# Patient Record
Sex: MALE | Race: WHITE | Employment: OTHER | RURAL
[De-identification: names, ages, dates, MRNs, and addresses within clinical notes are randomized per-mention and may not be internally consistent; named-entity substitution may affect disease eponyms.]

---

## 2019-07-18 PROBLEM — E11.9 CONTROLLED TYPE 2 DIABETES MELLITUS WITHOUT COMPLICATION, WITHOUT LONG-TERM CURRENT USE OF INSULIN (HCC): Status: ACTIVE | Noted: 2019-07-18

## 2019-07-18 PROBLEM — I73.9 PERIPHERAL VASCULAR DISEASE (HCC): Status: ACTIVE | Noted: 2019-07-18

## 2019-07-18 PROBLEM — Z98.890 HISTORY OF CAROTID ENDARTERECTOMY: Status: ACTIVE | Noted: 2019-07-18

## 2019-07-18 PROBLEM — I10 ESSENTIAL HYPERTENSION: Status: ACTIVE | Noted: 2019-07-18

## 2019-07-18 PROBLEM — E78.00 HYPERCHOLESTEROLEMIA: Status: ACTIVE | Noted: 2019-07-18

## 2019-10-07 ENCOUNTER — OFFICE VISIT (OUTPATIENT)
Dept: SURGERY | Age: 74
End: 2019-10-07

## 2019-10-07 VITALS
BODY MASS INDEX: 27.89 KG/M2 | SYSTOLIC BLOOD PRESSURE: 171 MMHG | TEMPERATURE: 97.7 F | RESPIRATION RATE: 18 BRPM | HEIGHT: 68 IN | WEIGHT: 184 LBS | OXYGEN SATURATION: 98 % | HEART RATE: 67 BPM | DIASTOLIC BLOOD PRESSURE: 91 MMHG

## 2019-10-07 DIAGNOSIS — R19.5 POSITIVE FIT (FECAL IMMUNOCHEMICAL TEST): Primary | ICD-10-CM

## 2019-10-07 NOTE — PATIENT INSTRUCTIONS
Learning About Colonoscopy  What is a colonoscopy? A colonoscopy is a test (also called a procedure) that lets a doctor look inside your large intestine. The doctor uses a thin, lighted tube called a colonoscope. The doctor uses it to look for small growths called polyps, colon or rectal cancer (colorectal cancer), or other problems like bleeding. During the procedure, the doctor can take samples of tissue. The samples can then be checked for cancer or other conditions. The doctor can also take out polyps. How is a colonoscopy done? This procedure is done in a doctor's office or a clinic or hospital. You will get medicine to help you relax and not feel pain. Some people find that they do not remember having the test because of the medicine. The doctor gently moves the colonoscope, or scope, through the colon. The scope is also a small video camera. It lets the doctor see the colon and take pictures. A colonoscopy usually takes 30 to 45 minutes. It may take longer if the doctor has to remove polyps. How do you prepare for the procedure? You need to clean out your colon before the procedure so the doctor can see all of your colon. You may start the cleaning process a day or two before the test. This depends on which \"colon prep\" your doctor recommends. To clean your colon, you stop eating solid foods and drink only clear liquids. You can have water, tea, coffee, clear juices, clear broths, flavored ice pops, and gelatin (such as Jell-O). Do not drink anything red or purple, such as grape juice or fruit punch. And do not eat red or purple foods, such as grape ice pops or cherry gelatin. The day or night before the procedure, you drink a large amount of a special liquid. This causes loose, frequent stools. You will go to the bathroom a lot. It is very important to drink all of the colon prep liquid. If you have problems drinking the liquid, call your doctor.   For many people, the prep is worse than the test. It may be uncomfortable, and you may feel hungry on the clear liquid diet. Some people do not go to work or do their usual activities on the day of the prep. Arrange to have someone take you home after the test.  What can you expect after a colonoscopy? The nurses will watch you for 1 to 2 hours until the medicines wear off. Then you can go home. You will need a ride. Your doctor will tell you when you can eat and do your usual activities. Your doctor will talk to you about when you will need your next colonoscopy. The results of your test and your risk for colorectal cancer will help your doctor decide how often you need to be checked. Follow-up care is a key part of your treatment and safety. Be sure to make and go to all appointments, and call your doctor if you are having problems. It's also a good idea to know your test results and keep a list of the medicines you take. Where can you learn more? Go to http://mario-romy.info/. Enter Z063 in the search box to learn more about \"Learning About Colonoscopy. \"  Current as of: December 19, 2018  Content Version: 12.2  © 4928-7125 "[employer]. Care instructions adapted under license by [employer] (which disclaims liability or warranty for this information). If you have questions about a medical condition or this instruction, always ask your healthcare professional. [address] any warranty or liability for your use of this information.

## 2019-10-07 NOTE — PROGRESS NOTES
Kolton Anthony is a 69 y.o. male who presents today with the following:  Chief Complaint   Patient presents with    Melena       HPI    67-year-old male who presents to me as a referral by Dr. Lázaro Castaneda for positive fit test.  He has never had a colonoscopy. He has no known family history of colon cancer. He denies any melena or hematochezia or abdominal pain or weight loss or diarrhea or constipation. 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 He underwent a right carotid endarterectomy that was complicated with a hematoma that required evacuation on postoperative day 0. He is actually scheduled to have the left carotid performed on November 5 at the [facility] in [facility]. He believes he has a 70% occlusion of the left carotid. He is on an 81 mg aspirin a day. Past Medical History:   Diagnosis Date    HTN (hypertension)     Stroke [facility])        Past Surgical History:   Procedure Laterality Date    HX CAROTID ENDARTERECTOMY Right 06/2019       Social History     Socioeconomic History    Marital status:      Spouse name: Not on file    Number of children: Not on file    Years of education: Not on file    Highest education level: Not on file   Occupational History    Not on file   Social Needs    Financial resource strain: Not on file    Food insecurity:     Worry: Not on file     Inability: Not on file    Transportation needs:     Medical: Not on file     Non-medical: Not on file   Tobacco Use    Smoking status: Former Smoker    Smokeless tobacco: Never Used   Substance and Sexual Activity    Alcohol use:  Yes     Alcohol/week: 3.0 standard drinks     Types: 3 Cans of beer per week    Drug use: Never    Sexual activity: Yes   Lifestyle    Physical activity:     Days per week: Not on file     Minutes per session: Not on file    Stress: Not on file   Relationships    Social connections:     Talks on phone: Not on file     Gets together: Not on file     Attends [employer] service: Not on file     Active member of club or organization: Not on file     Attends meetings of clubs or organizations: Not on file     Relationship status: Not on file    Intimate partner violence:     Fear of current or ex partner: Not on file     Emotionally abused: Not on file     Physically abused: Not on file     Forced sexual activity: Not on file   Other Topics Concern    Not on file   Social History Narrative    Not on file       Family History   Problem Relation Age of Onset    Diabetes Father        No Known Allergies    Current Outpatient Medications   Medication Sig    amLODIPine (NORVASC) 2.5 mg tablet Take 1 Tab by mouth daily.  atorvastatin (LIPITOR) 40 mg tablet Take 1 Tab by mouth daily.  metFORMIN ER (GLUCOPHAGE XR) 500 mg tablet Take 1 Tab by mouth daily.  metoprolol succinate (TOPROL-XL) 25 mg XL tablet Take 0.5 Tabs by mouth two (2) times a day. Take 0.5 tab by mouth every 12 hours    aspirin delayed-release 81 mg tablet Take  by mouth daily.  ketoconazole (NIZORAL) 2 % topical cream Apply  to affected area two (2) times a day. No current facility-administered medications for this visit. The above histories, medications and allergies have been reviewed. Review of Systems   HENT: Positive for tinnitus. Skin: Positive for itching. Visit Vitals  BP (!) 171/91 (BP 1 Location: Left arm, BP Patient Position: Sitting)   Pulse 67   Temp 97.7 °F (36.5 °C) (Oral)   Resp 18   Ht 5' 8\" (1.727 m)   Wt 184 lb (83.5 kg)   SpO2 98%   BMI 27.98 kg/m²     Physical Exam   Constitutional: He is well-developed, well-nourished, and in no distress. Cardiovascular: Normal rate, regular rhythm and normal heart sounds. Pulmonary/Chest: Effort normal and breath sounds normal. No respiratory distress. He has no wheezes. He has no rales. Abdominal: Soft. He exhibits no distension. There is no tenderness.  There is no rebound and no guarding. 1. Positive FIT (fecal immunochemical test)  Recommend colonoscopy. Would like to make sure that the vascular surgical team feels that this is appropriate prior to his left carotid endarterectomy. He is scheduled to be seen at the [facility] for a preoperative visit on October 24 with anticipated surgery on November 5. We will plan on colonoscopy on October 29 assuming that he gets the okay on his preoperative evaluation. Risks of the procedure were shared including the risks of missed lesions, incomplete exam or colon injury or perforation. He understands and wishes to proceed. Follow-up and Dispositions    · Return for post procedure.          Claudia Miller MD

## 2019-11-08 PROBLEM — Z87.891 FORMER SMOKER: Status: ACTIVE | Noted: 2019-11-08

## 2020-07-10 PROBLEM — B35.1 TINEA UNGUIUM: Status: ACTIVE | Noted: 2020-07-10

## 2020-07-10 PROBLEM — R19.5 POSITIVE FIT (FECAL IMMUNOCHEMICAL TEST): Status: RESOLVED | Noted: 2019-10-07 | Resolved: 2020-07-10

## 2021-03-18 ENCOUNTER — OFFICE VISIT (OUTPATIENT)
Dept: FAMILY MEDICINE CLINIC | Age: 76
End: 2021-03-18
Payer: OTHER GOVERNMENT

## 2021-03-18 VITALS
TEMPERATURE: 96.1 F | BODY MASS INDEX: 26.95 KG/M2 | HEART RATE: 60 BPM | HEIGHT: 68 IN | OXYGEN SATURATION: 97 % | RESPIRATION RATE: 18 BRPM | SYSTOLIC BLOOD PRESSURE: 127 MMHG | DIASTOLIC BLOOD PRESSURE: 72 MMHG | WEIGHT: 177.8 LBS

## 2021-03-18 DIAGNOSIS — B35.1 ONYCHOMYCOSIS: ICD-10-CM

## 2021-03-18 DIAGNOSIS — E11.9 CONTROLLED TYPE 2 DIABETES MELLITUS WITHOUT COMPLICATION, WITHOUT LONG-TERM CURRENT USE OF INSULIN (HCC): ICD-10-CM

## 2021-03-18 DIAGNOSIS — I10 ESSENTIAL HYPERTENSION: ICD-10-CM

## 2021-03-18 DIAGNOSIS — B35.1 TINEA UNGUIUM: ICD-10-CM

## 2021-03-18 DIAGNOSIS — Z98.890 HISTORY OF CAROTID ENDARTERECTOMY: ICD-10-CM

## 2021-03-18 DIAGNOSIS — I73.9 PERIPHERAL VASCULAR DISEASE (HCC): Primary | ICD-10-CM

## 2021-03-18 DIAGNOSIS — E78.00 HYPERCHOLESTEROLEMIA: ICD-10-CM

## 2021-03-18 PROCEDURE — 36415 COLL VENOUS BLD VENIPUNCTURE: CPT | Performed by: FAMILY MEDICINE

## 2021-03-18 PROCEDURE — 99214 OFFICE O/P EST MOD 30 MIN: CPT | Performed by: FAMILY MEDICINE

## 2021-03-18 RX ORDER — INSULIN PUMP SYRINGE, 3 ML
EACH MISCELLANEOUS
Qty: 1 KIT | Refills: 0 | Status: SHIPPED | OUTPATIENT
Start: 2021-03-18

## 2021-03-18 RX ORDER — AMLODIPINE BESYLATE 5 MG/1
5 TABLET ORAL DAILY
Qty: 90 TAB | Refills: 1 | Status: SHIPPED | OUTPATIENT
Start: 2021-03-18 | End: 2021-08-06 | Stop reason: SDUPTHER

## 2021-03-18 RX ORDER — ATORVASTATIN CALCIUM 40 MG/1
40 TABLET, FILM COATED ORAL DAILY
Qty: 90 TAB | Refills: 1 | Status: SHIPPED | OUTPATIENT
Start: 2021-03-18 | End: 2022-10-20 | Stop reason: SDUPTHER

## 2021-03-18 RX ORDER — HYDROCHLOROTHIAZIDE 12.5 MG/1
12.5 TABLET ORAL DAILY
Qty: 90 TAB | Refills: 1 | Status: SHIPPED | OUTPATIENT
Start: 2021-03-18 | End: 2022-01-26 | Stop reason: SDUPTHER

## 2021-03-18 RX ORDER — METFORMIN HYDROCHLORIDE 500 MG/1
500 TABLET, EXTENDED RELEASE ORAL DAILY
Qty: 90 TAB | Refills: 1 | Status: SHIPPED | OUTPATIENT
Start: 2021-03-18 | End: 2021-03-18

## 2021-03-18 RX ORDER — METOPROLOL TARTRATE 50 MG/1
50 TABLET ORAL 2 TIMES DAILY
Qty: 180 TAB | Refills: 1 | Status: SHIPPED | OUTPATIENT
Start: 2021-03-18 | End: 2021-08-06 | Stop reason: SDUPTHER

## 2021-03-18 RX ORDER — KETOCONAZOLE 20 MG/G
CREAM TOPICAL 2 TIMES DAILY
Qty: 30 G | Refills: 5 | Status: SHIPPED | OUTPATIENT
Start: 2021-03-18 | End: 2021-08-06 | Stop reason: SDUPTHER

## 2021-03-18 RX ORDER — ASPIRIN 81 MG/1
81 TABLET ORAL DAILY
Qty: 90 TAB | Refills: 1 | Status: SHIPPED | OUTPATIENT
Start: 2021-03-18

## 2021-03-18 NOTE — PROGRESS NOTES
1. Have you been to the ER, urgent care clinic since your last visit? Hospitalized since your last visit?no    2. Have you seen or consulted any other health care providers outside of the [address] [place] [address] since your last visit? Include any pap smears or colon screening.  no

## 2021-03-18 NOTE — PROGRESS NOTES
Mary Go is a 77 y.o. male who presents with the following:  Chief Complaint   Patient presents with    Hypertension    Vision Change    Cholesterol Problem    Diabetes       Patient's hypertension is doing well on his current medications without any chest pain shortness of breath nor edema. Patient's diabetes seems to be doing fairly well but he is due his laboratories and he has not been testing his sugars and he reports that he is having trouble with his vision although if he shaves his eyes he can see much better. He has not had an eye exam and over 2 years. I explained to the patient this could either be abnormal sugar being laid down in his lens or is developing a cataract but he needs a slit lamp examination by an ophthalmologist.  Patient does have elevated lipids and is on atorvastatin without any muscle pain or weakness. No Known Allergies    Current Outpatient Medications   Medication Sig    amLODIPine (NORVASC) 5 mg tablet Take 1 Tab by mouth daily.  aspirin delayed-release 81 mg tablet Take 1 Tab by mouth daily.  atorvastatin (LIPITOR) 40 mg tablet Take 1 Tab by mouth daily.  hydroCHLOROthiazide (HYDRODIURIL) 12.5 mg tablet Take 1 Tab by mouth daily.  ketoconazole (NIZORAL) 2 % topical cream Apply  to affected area two (2) times a day.  metFORMIN ER (GLUCOPHAGE XR) 500 mg tablet Take 1 Tab by mouth daily.  metoprolol tartrate (LOPRESSOR) 50 mg tablet Take 1 Tab by mouth two (2) times a day.  Blood-Glucose Meter monitoring kit Use daily as directed to monitor glucose     No current facility-administered medications for this visit.         Past Medical History:   Diagnosis Date    DM [facility]diabetes mellitus) ([facility] [address]     HTN (hypertension)     Positive FIT (fecal immunochemical test) 10/7/2019    Stroke [facility])        Past Surgical History:   Procedure Laterality Date    HX CAROTID ENDARTERECTOMY Right 06/2019    HX CAROTID ENDARTERECTOMY Left 11/05/2019       Family History Problem Relation Age of Onset    Diabetes Father        Social History     Socioeconomic History    Marital status:      Spouse name: Not on file    Number of children: Not on file    Years of education: Not on file    Highest education level: Not on file   Tobacco Use    Smoking status: Former Smoker    Smokeless tobacco: Never Used   Substance and Sexual Activity    Alcohol use: Yes     Alcohol/week: 3.0 standard drinks     Types: 3 Cans of beer per week    Drug use: Never    Sexual activity: Yes       Review of Systems   Constitutional: Negative for chills, fever, malaise/fatigue and weight loss. HENT: Negative for congestion, hearing loss, sore throat and tinnitus. Eyes: Positive for blurred vision. Negative for pain and discharge. Respiratory: Negative for cough, shortness of breath and wheezing. Cardiovascular: Negative for chest pain, palpitations, orthopnea, claudication and leg swelling. Gastrointestinal: Negative for abdominal pain, constipation and heartburn. Genitourinary: Negative for dysuria, frequency and urgency. Musculoskeletal: Negative for falls, joint pain and myalgias. Skin: Negative for itching and rash. Neurological: Negative for dizziness, tingling, tremors and headaches. Endo/Heme/Allergies: Negative for environmental allergies and polydipsia. Psychiatric/Behavioral: Negative for depression and substance abuse. The patient is not nervous/anxious. Visit Vitals  /72   Pulse 60   Temp (!) 96.1 °F (35.6 °C)   Resp 18   Ht 5' 8\" (1.727 m)   Wt 177 lb 12.8 oz (80.6 kg)   SpO2 97%   BMI 27.03 kg/m²     Physical Exam  Vitals signs reviewed. Constitutional:       General: He is not in acute distress. Appearance: Normal appearance. He is not ill-appearing. HENT:      Head: Normocephalic and atraumatic.       Right Ear: Tympanic membrane, ear canal and external ear normal.      Left Ear: Tympanic membrane, ear canal and external ear normal. Nose: Nose normal. No congestion or rhinorrhea. Mouth/Throat:      Mouth: Mucous membranes are moist.      Pharynx: No oropharyngeal exudate or posterior oropharyngeal erythema. Eyes:      Extraocular Movements: Extraocular movements intact. Conjunctiva/sclera: Conjunctivae normal.      Pupils: Pupils are equal, round, and reactive to light. Comments: Pupil iris and anterior chamber normal.   Neck:      Musculoskeletal: Normal range of motion and neck supple. Trachea: No tracheal deviation. Cardiovascular:      Rate and Rhythm: Normal rate and regular rhythm. Pulses: Normal pulses. Heart sounds: Normal heart sounds. No murmur. No friction rub. No gallop. Pulmonary:      Effort: Pulmonary effort is normal. No respiratory distress. Breath sounds: Normal breath sounds. No wheezing, rhonchi or rales. Chest:      Chest wall: No tenderness. Abdominal:      General: Bowel sounds are normal. There is no distension. Palpations: Abdomen is soft. There is no mass. Tenderness: There is no abdominal tenderness. There is no guarding or rebound. Hernia: No hernia is present. Musculoskeletal: Normal range of motion. General: No tenderness. Right lower leg: No edema. Left lower leg: No edema. Lymphadenopathy:      Cervical: No cervical adenopathy. Skin:     General: Skin is warm and dry. Findings: No erythema or rash. Neurological:      General: No focal deficit present. Mental Status: He is alert and oriented to person, place, and time. Cranial Nerves: No cranial nerve deficit. Motor: No abnormal muscle tone. Deep Tendon Reflexes: Reflexes are normal and symmetric. Reflexes normal.      Comments: Cranial nerves II through XII intact sensory and motor.   Deep tendon reflexes in the biceps triceps knee and ankle are normal and bilaterally symmetrical.   Psychiatric:         Mood and Affect: Mood normal. Behavior: Behavior normal.         Thought Content: Thought content normal.         Judgment: Judgment normal.           ICD-10-CM ICD-9-CM    1. Peripheral vascular disease (HCC)  I73.9 443.9    2. Essential hypertension  I10 401.9 amLODIPine (NORVASC) 5 mg tablet      hydroCHLOROthiazide (HYDRODIURIL) 12.5 mg tablet      metoprolol tartrate (LOPRESSOR) 50 mg tablet   3. Controlled type 2 diabetes mellitus without complication, without long-term current use of insulin (HCC)  E11.9 250.00 metFORMIN ER (GLUCOPHAGE XR) 500 mg tablet      Blood-Glucose Meter monitoring kit      COLLECTION VENOUS BLOOD,VENIPUNCTURE      METABOLIC PANEL, COMPREHENSIVE      CBC WITH AUTOMATED DIFF      LIPID PANEL      MICROALBUMIN, UR, RAND W/ MICROALB/CREAT RATIO      HEMOGLOBIN A1C WITH EAG      HEMOGLOBIN A1C WITH EAG      MICROALBUMIN, UR, RAND W/ MICROALB/CREAT RATIO      LIPID PANEL      CBC WITH AUTOMATED DIFF      METABOLIC PANEL, COMPREHENSIVE   4. Hypercholesterolemia  E78.00 272.0 atorvastatin (LIPITOR) 40 mg tablet   5. History of carotid endarterectomy  Z98.890 V45.89 aspirin delayed-release 81 mg tablet   6. Onychomycosis  B35.1 110.1 ketoconazole (NIZORAL) 2 % topical cream   7.  Tinea unguium  B35.1 110.1 ketoconazole (NIZORAL) 2 % topical cream       Orders Placed This Encounter    COLLECTION VENOUS BLOOD,VENIPUNCTURE    METABOLIC PANEL, COMPREHENSIVE     Standing Status:   Future     Number of Occurrences:   1     Standing Expiration Date:   3/18/2022    CBC WITH AUTOMATED DIFF     Standing Status:   Future     Number of Occurrences:   1     Standing Expiration Date:   3/18/2022    LIPID PANEL     Standing Status:   Future     Number of Occurrences:   1     Standing Expiration Date:   3/18/2022    MICROALBUMIN, UR, RAND W/ MICROALB/CREAT RATIO     Standing Status:   Future     Number of Occurrences:   1     Standing Expiration Date:   3/18/2022    HEMOGLOBIN A1C WITH EAG     Standing Status:   Future     Number of Occurrences:   1     Standing Expiration Date:   3/18/2022    amLODIPine (NORVASC) 5 mg tablet     Sig: Take 1 Tab by mouth daily. Dispense:  90 Tab     Refill:  1    aspirin delayed-release 81 mg tablet     Sig: Take 1 Tab by mouth daily. Dispense:  90 Tab     Refill:  1    atorvastatin (LIPITOR) 40 mg tablet     Sig: Take 1 Tab by mouth daily. Dispense:  90 Tab     Refill:  1    hydroCHLOROthiazide (HYDRODIURIL) 12.5 mg tablet     Sig: Take 1 Tab by mouth daily. Dispense:  90 Tab     Refill:  1    ketoconazole (NIZORAL) 2 % topical cream     Sig: Apply  to affected area two (2) times a day. Dispense:  30 g     Refill:  5    metFORMIN ER (GLUCOPHAGE XR) 500 mg tablet     Sig: Take 1 Tab by mouth daily. Dispense:  90 Tab     Refill:  1    metoprolol tartrate (LOPRESSOR) 50 mg tablet     Sig: Take 1 Tab by mouth two (2) times a day. Dispense:  180 Tab     Refill:  1    Blood-Glucose Meter monitoring kit     Sig: Use daily as directed to monitor glucose     Dispense:  1 Kit     Refill:  0   Patient is reminded that he needs yearly ophthalmologic evaluation. Follow-up and Dispositions    · Return in about 6 months (around 9/18/2021), or if symptoms worsen or fail to improve.          Colin Ledesma MD

## 2021-03-19 DIAGNOSIS — E11.9 CONTROLLED TYPE 2 DIABETES MELLITUS WITHOUT COMPLICATION, WITHOUT LONG-TERM CURRENT USE OF INSULIN (HCC): ICD-10-CM

## 2021-03-19 LAB
ALBUMIN SERPL-MCNC: 3.7 G/DL (ref 3.5–5)
ALBUMIN/GLOB SERPL: 1.3 {RATIO} (ref 1.1–2.2)
ALP SERPL-CCNC: 83 U/L (ref 45–117)
ALT SERPL-CCNC: 25 U/L (ref 12–78)
ANION GAP SERPL CALC-SCNC: 5 MMOL/L (ref 5–15)
AST SERPL-CCNC: 11 U/L (ref 15–37)
BASOPHILS # BLD: 0.1 K/UL (ref 0–0.1)
BASOPHILS NFR BLD: 1 % (ref 0–1)
BILIRUB SERPL-MCNC: 0.5 MG/DL (ref 0.2–1)
BUN SERPL-MCNC: 15 MG/DL (ref 6–20)
BUN/CREAT SERPL: 13 (ref 12–20)
CALCIUM SERPL-MCNC: 9.2 MG/DL (ref 8.5–10.1)
CHLORIDE SERPL-SCNC: 103 MMOL/L (ref 97–108)
CHOLEST SERPL-MCNC: 200 MG/DL
CO2 SERPL-SCNC: 29 MMOL/L (ref 21–32)
CREAT SERPL-MCNC: 1.13 MG/DL (ref 0.7–1.3)
CREAT UR-MCNC: 201 MG/DL
DIFFERENTIAL METHOD BLD: NORMAL
EOSINOPHIL # BLD: 0.3 K/UL (ref 0–0.4)
EOSINOPHIL NFR BLD: 6 % (ref 0–7)
ERYTHROCYTE [DISTWIDTH] IN BLOOD BY AUTOMATED COUNT: 12.9 % (ref 11.5–14.5)
EST. AVERAGE GLUCOSE BLD GHB EST-MCNC: 260 MG/DL
GLOBULIN SER CALC-MCNC: 2.9 G/DL (ref 2–4)
GLUCOSE SERPL-MCNC: 209 MG/DL (ref 65–100)
HBA1C MFR BLD: 10.7 % (ref 4–5.6)
HCT VFR BLD AUTO: 43.8 % (ref 36.6–50.3)
HDLC SERPL-MCNC: 44 MG/DL
HDLC SERPL: 4.5 {RATIO} (ref 0–5)
HGB BLD-MCNC: 15.2 G/DL (ref 12.1–17)
IMM GRANULOCYTES # BLD AUTO: 0 K/UL (ref 0–0.04)
IMM GRANULOCYTES NFR BLD AUTO: 0 % (ref 0–0.5)
LDLC SERPL CALC-MCNC: 131.6 MG/DL (ref 0–100)
LIPID PROFILE,FLP: ABNORMAL
LYMPHOCYTES # BLD: 1 K/UL (ref 0.8–3.5)
LYMPHOCYTES NFR BLD: 19 % (ref 12–49)
MCH RBC QN AUTO: 30.6 PG (ref 26–34)
MCHC RBC AUTO-ENTMCNC: 34.7 G/DL (ref 30–36.5)
MCV RBC AUTO: 88.3 FL (ref 80–99)
MICROALBUMIN UR-MCNC: 3.03 MG/DL
MICROALBUMIN/CREAT UR-RTO: 15 MG/G (ref 0–30)
MONOCYTES # BLD: 0.6 K/UL (ref 0–1)
MONOCYTES NFR BLD: 11 % (ref 5–13)
NEUTS SEG # BLD: 3.3 K/UL (ref 1.8–8)
NEUTS SEG NFR BLD: 63 % (ref 32–75)
NRBC # BLD: 0 K/UL (ref 0–0.01)
NRBC BLD-RTO: 0 PER 100 WBC
PLATELET # BLD AUTO: 289 K/UL (ref 150–400)
PMV BLD AUTO: 11.4 FL (ref 8.9–12.9)
POTASSIUM SERPL-SCNC: 3.8 MMOL/L (ref 3.5–5.1)
PROT SERPL-MCNC: 6.6 G/DL (ref 6.4–8.2)
RBC # BLD AUTO: 4.96 M/UL (ref 4.1–5.7)
SODIUM SERPL-SCNC: 137 MMOL/L (ref 136–145)
TRIGL SERPL-MCNC: 122 MG/DL (ref ?–150)
VLDLC SERPL CALC-MCNC: 24.4 MG/DL
WBC # BLD AUTO: 5.2 K/UL (ref 4.1–11.1)

## 2021-03-19 RX ORDER — METFORMIN HYDROCHLORIDE 500 MG/1
TABLET, EXTENDED RELEASE ORAL
Qty: 180 TAB | Refills: 0
Start: 2021-03-19 | End: 2021-08-06 | Stop reason: SDUPTHER

## 2021-03-19 NOTE — PROGRESS NOTES
Patient identified by two patient identifiers, name and date of birth. Spoke with patient. Patient aware of results and states understanding at this time. Patient is scheduling an appt for 4 weeks.

## 2021-03-26 ENCOUNTER — TELEPHONE (OUTPATIENT)
Dept: FAMILY MEDICINE CLINIC | Age: 76
End: 2021-03-26

## 2021-03-26 NOTE — TELEPHONE ENCOUNTER
Patient';s wife called and stated patient need referral for ophthamology for his vision changes and a referral form from the [facility] [place] [facility]). Can you please place the referral and I will fill out the [place] form to have Dr. Patel Bodily sign.

## 2021-03-30 DIAGNOSIS — H54.7 VISION PROBLEM: Primary | ICD-10-CM

## 2021-04-02 NOTE — TELEPHONE ENCOUNTER
[facility] on 04[facility]01/21. Received notice from [place] today that referral was placed.

## 2021-04-21 ENCOUNTER — OFFICE VISIT (OUTPATIENT)
Dept: FAMILY MEDICINE CLINIC | Age: 76
End: 2021-04-21
Payer: OTHER GOVERNMENT

## 2021-04-21 VITALS
BODY MASS INDEX: 25.84 KG/M2 | DIASTOLIC BLOOD PRESSURE: 70 MMHG | TEMPERATURE: 97.4 F | HEIGHT: 68 IN | HEART RATE: 66 BPM | RESPIRATION RATE: 18 BRPM | OXYGEN SATURATION: 98 % | SYSTOLIC BLOOD PRESSURE: 126 MMHG | WEIGHT: 170.5 LBS

## 2021-04-21 DIAGNOSIS — E78.00 HYPERCHOLESTEROLEMIA: ICD-10-CM

## 2021-04-21 DIAGNOSIS — E11.9 CONTROLLED TYPE 2 DIABETES MELLITUS WITHOUT COMPLICATION, WITHOUT LONG-TERM CURRENT USE OF INSULIN (HCC): ICD-10-CM

## 2021-04-21 DIAGNOSIS — Z98.890 HISTORY OF CAROTID ENDARTERECTOMY: ICD-10-CM

## 2021-04-21 DIAGNOSIS — I10 ESSENTIAL HYPERTENSION: ICD-10-CM

## 2021-04-21 DIAGNOSIS — I73.9 PERIPHERAL VASCULAR DISEASE (HCC): Primary | ICD-10-CM

## 2021-04-21 PROCEDURE — 99213 OFFICE O/P EST LOW 20 MIN: CPT | Performed by: FAMILY MEDICINE

## 2021-04-21 NOTE — PROGRESS NOTES
Kendra Yousif is a 77 y.o. male who presents with the following:  Chief Complaint   Patient presents with    Diabetes    Hypertension    Cholesterol Problem       Patient states that he thinks his diabetes is doing fairly well on his Metformin but he has not been checking his blood sugars. He has been watching his diet and he has been working at getting exercise. He denies any polyuria denies any abnormal weight loss denies any numbness or tingling or visual problems. The patient's hypertension is doing well on his current medication without any chest pain shortness of breath nor edema. The patient's cholesterol is being controlled with atorvastatin 40 mg daily without any muscle pain nor weakness. No Known Allergies    Current Outpatient Medications   Medication Sig    metFORMIN ER (GLUCOPHAGE XR) 500 mg tablet 1 twice daily with meals    amLODIPine (NORVASC) 5 mg tablet Take 1 Tab by mouth daily.  aspirin delayed-release 81 mg tablet Take 1 Tab by mouth daily.  atorvastatin (LIPITOR) 40 mg tablet Take 1 Tab by mouth daily.  hydroCHLOROthiazide (HYDRODIURIL) 12.5 mg tablet Take 1 Tab by mouth daily.  ketoconazole (NIZORAL) 2 % topical cream Apply  to affected area two (2) times a day.  metoprolol tartrate (LOPRESSOR) 50 mg tablet Take 1 Tab by mouth two (2) times a day.  Blood-Glucose Meter monitoring kit Use daily as directed to monitor glucose     No current facility-administered medications for this visit.         Past Medical History:   Diagnosis Date    DM (diabetes mellitus) ([address]     HTN (hypertension)     Positive FIT (fecal immunochemical test) 10/7/2019    Stroke [facility])        Past Surgical History:   Procedure Laterality Date    HX CAROTID ENDARTERECTOMY Right 06/2019    HX CAROTID ENDARTERECTOMY Left 11/05/2019       Family History   Problem Relation Age of Onset    Diabetes Father        Social History     Socioeconomic History    Marital status:      Spouse name: Not on file    Number of children: Not on file    Years of education: Not on file    Highest education level: Not on file   Tobacco Use    Smoking status: Former Smoker    Smokeless tobacco: Never Used   Substance and Sexual Activity    Alcohol use: Yes     Alcohol/week: 3.0 standard drinks     Types: 3 Cans of beer per week    Drug use: Never    Sexual activity: Yes       Review of Systems   Constitutional: Negative for chills, fever, malaise/fatigue and weight loss. HENT: Negative for congestion, hearing loss, sore throat and tinnitus. Eyes: Negative for blurred vision, pain and discharge. Respiratory: Negative for cough, shortness of breath and wheezing. Cardiovascular: Negative for chest pain, palpitations, orthopnea, claudication and leg swelling. Gastrointestinal: Negative for abdominal pain, constipation and heartburn. Genitourinary: Negative for dysuria, frequency and urgency. Musculoskeletal: Negative for falls, joint pain and myalgias. Skin: Negative for itching and rash. Neurological: Negative for dizziness, tingling, tremors and headaches. Endo/Heme/Allergies: Negative for environmental allergies and polydipsia. Psychiatric/Behavioral: Negative for depression and substance abuse. The patient is not nervous/anxious. Visit Vitals  /70 (BP 1 Location: Left upper arm)   Pulse 66   Temp 97.4 °F (36.3 °C) (Temporal)   Resp 18   Ht 5' 8\" (1.727 m)   Wt 170 lb 8 oz (77.3 kg)   SpO2 98%   BMI 25.92 kg/m²     Physical Exam  Vitals signs reviewed. Constitutional:       General: He is not in acute distress. Appearance: Normal appearance. He is normal weight. He is not ill-appearing. HENT:      Head: Normocephalic and atraumatic. Right Ear: Tympanic membrane, ear canal and external ear normal.      Left Ear: Tympanic membrane, ear canal and external ear normal.      Nose: Nose normal. No congestion or rhinorrhea.       Mouth/Throat:      Mouth: Mucous membranes are moist.      Pharynx: No oropharyngeal exudate or posterior oropharyngeal erythema. Eyes:      Extraocular Movements: Extraocular movements intact. Conjunctiva/sclera: Conjunctivae normal.      Pupils: Pupils are equal, round, and reactive to light. Comments: Pupil iris and anterior chamber normal.   Neck:      Musculoskeletal: Normal range of motion and neck supple. Trachea: No tracheal deviation. Cardiovascular:      Rate and Rhythm: Normal rate and regular rhythm. Pulses: Normal pulses. Heart sounds: Normal heart sounds. No murmur. No friction rub. No gallop. Pulmonary:      Effort: Pulmonary effort is normal. No respiratory distress. Breath sounds: Normal breath sounds. No wheezing, rhonchi or rales. Chest:      Chest wall: No tenderness. Abdominal:      General: Bowel sounds are normal. There is no distension. Palpations: Abdomen is soft. There is no mass. Tenderness: There is no abdominal tenderness. There is no guarding or rebound. Hernia: No hernia is present. Musculoskeletal: Normal range of motion. General: No tenderness. Right lower leg: No edema. Left lower leg: No edema. Lymphadenopathy:      Cervical: No cervical adenopathy. Skin:     General: Skin is warm and dry. Findings: No erythema or rash. Neurological:      General: No focal deficit present. Mental Status: He is alert and oriented to person, place, and time. Cranial Nerves: No cranial nerve deficit. Motor: No abnormal muscle tone. Deep Tendon Reflexes: Reflexes are normal and symmetric. Reflexes normal.      Comments: Cranial nerves II through XII intact sensory and motor. Deep tendon reflexes in the biceps triceps knee and ankle are normal and bilaterally symmetrical.   Psychiatric:         Mood and Affect: Mood normal.         Behavior: Behavior normal.         Thought Content:  Thought content normal.         Judgment: Judgment normal.           ICD-10-CM ICD-9-CM    1. Peripheral vascular disease (HCC)  I73.9 443.9    2. Essential hypertension  I10 401.9    3. Controlled type 2 diabetes mellitus without complication, without long-term current use of insulin (HCC)  E11.9 250.00    4. Hypercholesterolemia  E78.00 272.0    5. History of carotid endarterectomy  Z98.890 V45.89        No orders of the defined types were placed in this encounter. Tell the patient I thought it was a bit early to be doing this lab work over again as it was just done last month. I told him we normally would do the laboratory at 3-month intervals if he was out of control but if he was in control we do it at 6-month intervals. I did tell the patient he needed to get a glucometer and a diabetic diary and he needed to start writing down daily glucose numbers so we would have an idea of where he is on a consistent basis. At this time is blood sugar numbers look really good and I told him if he could keep these in this range that his A1c should look very good when we do it again and 2 more months. Follow-up and Dispositions    · Return in about 2 months (around 6/21/2021).          Sue Vazquez MD

## 2021-04-21 NOTE — PROGRESS NOTES
1. Have you been to the ER, urgent care clinic since your last visit? Hospitalized since your last visit? No    2. Have you seen or consulted any other health care providers outside of the [address] [place] [address] since your last visit? Include any pap smears or colon screening.  No

## 2021-08-06 ENCOUNTER — OFFICE VISIT (OUTPATIENT)
Dept: FAMILY MEDICINE CLINIC | Age: 76
End: 2021-08-06
Payer: OTHER GOVERNMENT

## 2021-08-06 VITALS
SYSTOLIC BLOOD PRESSURE: 137 MMHG | WEIGHT: 178.31 LBS | HEIGHT: 68 IN | RESPIRATION RATE: 18 BRPM | HEART RATE: 62 BPM | BODY MASS INDEX: 27.02 KG/M2 | TEMPERATURE: 98.3 F | OXYGEN SATURATION: 96 % | DIASTOLIC BLOOD PRESSURE: 73 MMHG

## 2021-08-06 DIAGNOSIS — I73.9 PERIPHERAL VASCULAR DISEASE (HCC): Primary | ICD-10-CM

## 2021-08-06 DIAGNOSIS — E78.00 HYPERCHOLESTEROLEMIA: ICD-10-CM

## 2021-08-06 DIAGNOSIS — B35.1 ONYCHOMYCOSIS: ICD-10-CM

## 2021-08-06 DIAGNOSIS — Z12.11 COLON CANCER SCREENING: ICD-10-CM

## 2021-08-06 DIAGNOSIS — I10 ESSENTIAL HYPERTENSION: ICD-10-CM

## 2021-08-06 DIAGNOSIS — B35.1 TINEA UNGUIUM: ICD-10-CM

## 2021-08-06 DIAGNOSIS — E11.9 CONTROLLED TYPE 2 DIABETES MELLITUS WITHOUT COMPLICATION, WITHOUT LONG-TERM CURRENT USE OF INSULIN (HCC): ICD-10-CM

## 2021-08-06 PROCEDURE — 36415 COLL VENOUS BLD VENIPUNCTURE: CPT | Performed by: FAMILY MEDICINE

## 2021-08-06 PROCEDURE — 99214 OFFICE O/P EST MOD 30 MIN: CPT | Performed by: FAMILY MEDICINE

## 2021-08-06 RX ORDER — AMLODIPINE BESYLATE 5 MG/1
TABLET ORAL
Qty: 90 TABLET | Refills: 1 | Status: SHIPPED | OUTPATIENT
Start: 2021-08-06 | End: 2022-02-18 | Stop reason: SDUPTHER

## 2021-08-06 RX ORDER — KETOCONAZOLE 20 MG/G
CREAM TOPICAL 2 TIMES DAILY
Qty: 30 G | Refills: 5 | Status: SHIPPED | OUTPATIENT
Start: 2021-08-06 | End: 2022-10-20 | Stop reason: SDUPTHER

## 2021-08-06 RX ORDER — METFORMIN HYDROCHLORIDE 500 MG/1
TABLET, EXTENDED RELEASE ORAL
Qty: 180 TABLET | Refills: 0 | Status: SHIPPED | OUTPATIENT
Start: 2021-08-06 | End: 2022-01-19 | Stop reason: SDUPTHER

## 2021-08-06 RX ORDER — METOPROLOL TARTRATE 50 MG/1
50 TABLET ORAL 2 TIMES DAILY
Qty: 180 TABLET | Refills: 1 | Status: SHIPPED | OUTPATIENT
Start: 2021-08-06 | End: 2022-01-19 | Stop reason: SDUPTHER

## 2021-08-06 NOTE — PROGRESS NOTES
1. Have you been to the ER, urgent care clinic since your last visit? Hospitalized since your last visit? No    2. Have you seen or consulted any other health care providers outside of the [address] [place] [address] since your last visit? Include any pap smears or colon screening. [facility] for cataract surgery.      Chief Complaint   Patient presents with    Diabetes    Hypertension     Visit Vitals  /73 (BP 1 Location: Left arm, BP Patient Position: Sitting)   Pulse 62   Temp 98.3 °F (36.8 °C) (Temporal)   Resp 18   Ht 5' 8\" (1.727 m)   Wt 178 lb 5 oz (80.9 kg)   SpO2 96%   BMI 27.11 kg/m²

## 2021-08-06 NOTE — PROGRESS NOTES
Carson Ram is a 77 y.o. male who presents with the following:  Chief Complaint   Patient presents with    Diabetes    Hypertension    Cholesterol Problem       Patient has not been checking his blood sugars and the only blood sugars is actually done were Sunday Monday Tuesday Wednesday of this week and today Friday and these were all done in the morning and there are none before lunch none before supper none before bedtime. The patient has only been taking his Metformin once a day rather than twice a day and he states that I told him to do it that way when in the chart it definitely says twice a day. Patient states that he had lab work done but it is not here and has not been sent to us and I know for sure they did not do a lipid or an A1c or microalbumin prior to surgery as they may have done a basic metabolic panel and a CBC but that would be it. The patient's blood pressure is doing fairly well as his blood pressure initially today was 137/73 and actually settled down to 110/68 on his current medication and is having no chest pain no shortness of breath and no edema. The patient is tolerating his atorvastatin for his cholesterol management without any muscle pain or weakness. No Known Allergies    Current Outpatient Medications   Medication Sig    metFORMIN ER (GLUCOPHAGE XR) 500 mg tablet 1 twice daily with meals    amLODIPine (NORVASC) 5 mg tablet Take 1 Tab by mouth daily.  metoprolol tartrate (LOPRESSOR) 50 mg tablet Take 1 Tablet by mouth two (2) times a day.  ketoconazole (NIZORAL) 2 % topical cream Apply  to affected area two (2) times a day.  aspirin delayed-release 81 mg tablet Take 1 Tab by mouth daily.  atorvastatin (LIPITOR) 40 mg tablet Take 1 Tab by mouth daily.  hydroCHLOROthiazide (HYDRODIURIL) 12.5 mg tablet Take 1 Tab by mouth daily.     Blood-Glucose Meter monitoring kit Use daily as directed to monitor glucose     No current facility-administered medications for this visit. Past Medical History:   Diagnosis Date    DM (diabetes mellitus) ([address]     HTN (hypertension)     Positive FIT (fecal immunochemical test) 10/7/2019    Stroke [facility])        Past Surgical History:   Procedure Laterality Date    HX CAROTID ENDARTERECTOMY Right 06/2019    HX CAROTID ENDARTERECTOMY Left 11/05/2019       Family History   Problem Relation Age of Onset    Diabetes Father        Social History     Socioeconomic History    Marital status:      Spouse name: Not on file    Number of children: Not on file    Years of education: Not on file    Highest education level: Not on file   Tobacco Use    Smoking status: Former Smoker    Smokeless tobacco: Never Used   Substance and Sexual Activity    Alcohol use: Yes     Alcohol/week: 3.0 standard drinks     Types: 3 Cans of beer per week    Drug use: Never    Sexual activity: Yes     Social Determinants of Health     Financial Resource Strain:     Difficulty of Paying Living Expenses:    Food Insecurity:     Worried About Running Out of Food in the Last Year:     [address] in the Last Year:    Transportation Needs:     Lack of Transportation (Medical):  Lack of Transportation (Non-Medical):    Physical Activity:     Days of Exercise per Week:     Minutes of Exercise per Session:    Stress:     Feeling of Stress :    Social Connections:     Frequency of Communication with Friends and Family:     Frequency of Social Gatherings with Friends and Family:     Attends Spiritism Services:     Active Member of Clubs or Organizations:     Attends Club or Organization Meetings:     Marital Status:        Review of Systems   Constitutional: Negative for chills, fever, malaise/fatigue and weight loss. HENT: Negative for congestion, hearing loss, sore throat and tinnitus. Eyes: Negative for blurred vision, pain and discharge. Respiratory: Negative for cough, shortness of breath and wheezing.     Cardiovascular: Negative for chest pain, palpitations, orthopnea, claudication and leg swelling. Gastrointestinal: Negative for abdominal pain, constipation and heartburn. Genitourinary: Negative for dysuria, frequency and urgency. Musculoskeletal: Negative for falls, joint pain and myalgias. Skin: Negative for itching and rash. Neurological: Negative for dizziness, tingling, tremors and headaches. Endo/Heme/Allergies: Negative for environmental allergies and polydipsia. Psychiatric/Behavioral: Negative for depression and substance abuse. The patient is not nervous/anxious. Visit Vitals  /73 (BP 1 Location: Left arm, BP Patient Position: Sitting)   Pulse 62   Temp 98.3 °F (36.8 °C) (Temporal)   Resp 18   Ht 5' 8\" (1.727 m)   Wt 178 lb 5 oz (80.9 kg)   SpO2 96%   BMI 27.11 kg/m²     Physical Exam  Vitals reviewed. Constitutional:       General: He is not in acute distress. Appearance: Normal appearance. He is not ill-appearing. HENT:      Head: Normocephalic and atraumatic. Right Ear: Tympanic membrane, ear canal and external ear normal.      Left Ear: Tympanic membrane, ear canal and external ear normal.      Nose: Nose normal. No congestion or rhinorrhea. Mouth/Throat:      Mouth: Mucous membranes are moist.      Pharynx: No oropharyngeal exudate or posterior oropharyngeal erythema. Eyes:      Extraocular Movements: Extraocular movements intact. Conjunctiva/sclera: Conjunctivae normal.      Pupils: Pupils are equal, round, and reactive to light. Comments: Pupil iris and anterior chamber normal.   Neck:      Trachea: No tracheal deviation. Cardiovascular:      Rate and Rhythm: Normal rate and regular rhythm. Pulses: Normal pulses. Heart sounds: Normal heart sounds. No murmur heard. No friction rub. No gallop. Pulmonary:      Effort: Pulmonary effort is normal. No respiratory distress. Breath sounds: Normal breath sounds. No wheezing, rhonchi or rales. Chest:      Chest wall: No tenderness. Abdominal:      General: Bowel sounds are normal. There is no distension. Palpations: Abdomen is soft. There is no mass. Tenderness: There is no abdominal tenderness. There is no guarding or rebound. Hernia: No hernia is present. Musculoskeletal:         General: No tenderness. Normal range of motion. Cervical back: Normal range of motion and neck supple. Right lower leg: No edema. Left lower leg: No edema. Lymphadenopathy:      Cervical: No cervical adenopathy. Skin:     General: Skin is warm and dry. Findings: No erythema or rash. Neurological:      General: No focal deficit present. Mental Status: He is alert and oriented to person, place, and time. Cranial Nerves: No cranial nerve deficit. Motor: No abnormal muscle tone. Deep Tendon Reflexes: Reflexes are normal and symmetric. Reflexes normal.      Comments: Cranial nerves II through XII intact sensory and motor. Deep tendon reflexes in the biceps triceps knee and ankle are normal and bilaterally symmetrical.   Psychiatric:         Mood and Affect: Mood normal.         Behavior: Behavior normal.         Thought Content: Thought content normal.         Judgment: Judgment normal.           ICD-10-CM ICD-9-CM    1. Peripheral vascular disease (HCC)  I73.9 443.9 COLLECTION VENOUS BLOOD,VENIPUNCTURE   2. Controlled type 2 diabetes mellitus without complication, without long-term current use of insulin (HCC)  E11.9 250.00 metFORMIN ER (GLUCOPHAGE XR) 500 mg tablet       DIABETES FOOT EXAM      HEMOGLOBIN A1C WITH EAG      MICROALBUMIN, UR, RAND W/ MICROALB/CREAT RATIO      LIPID PANEL      LIPID PANEL      MICROALBUMIN, UR, RAND W/ MICROALB/CREAT RATIO      HEMOGLOBIN A1C WITH EAG      COLLECTION VENOUS BLOOD,VENIPUNCTURE   3.  Essential hypertension  I10 401.9 amLODIPine (NORVASC) 5 mg tablet      metoprolol tartrate (LOPRESSOR) 50 mg tablet      COLLECTION VENOUS BLOOD,VENIPUNCTURE   4. Hypercholesterolemia  E78.00 272.0 COLLECTION VENOUS BLOOD,VENIPUNCTURE   5. Colon cancer screening  Z12.11 V76.51 OCCULT BLOOD IMMUNOASSAY,DIAGNOSTIC      OCCULT BLOOD IMMUNOASSAY,DIAGNOSTIC   6. Onychomycosis  B35.1 110.1 ketoconazole (NIZORAL) 2 % topical cream   7. Tinea unguium  B35.1 110.1 ketoconazole (NIZORAL) 2 % topical cream       Orders Placed This Encounter    COLLECTION VENOUS BLOOD,VENIPUNCTURE    HEMOGLOBIN A1C WITH EAG     Standing Status:   Future     Number of Occurrences:   1     Standing Expiration Date:   2022    MICROALBUMIN, UR, RAND W/ MICROALB/CREAT RATIO     Standing Status:   Future     Number of Occurrences:   1     Standing Expiration Date:   2022    OCCULT BLOOD IMMUNOASSAY,DIAGNOSTIC     Standing Status:   Future     Number of Occurrences:   1     Standing Expiration Date:   2022     Order Specific Question:   QUEST SOURCE     Answer:   Stool [1161]    LIPID PANEL     Standing Status:   Future     Number of Occurrences:   1     Standing Expiration Date:   2022     DIABETES FOOT EXAM    metFORMIN ER (GLUCOPHAGE XR) 500 mg tablet     Si twice daily with meals     Dispense:  180 Tablet     Refill:  0    amLODIPine (NORVASC) 5 mg tablet     Sig: Take 1 Tab by mouth daily. Dispense:  90 Tablet     Refill:  1    metoprolol tartrate (LOPRESSOR) 50 mg tablet     Sig: Take 1 Tablet by mouth two (2) times a day. Dispense:  180 Tablet     Refill:  1    ketoconazole (NIZORAL) 2 % topical cream     Sig: Apply  to affected area two (2) times a day.      Dispense:  30 g     Refill:  5   Pt with wnl DM foot exam without any orthopedic deformity with normal pulsations in the dorsalis pedis and posterior tibial arteries bilaterally and normal neurological examination including monofilament and light touch and noting no pre-ulcerative callus formation nor any pressure ulcers and noting the skin is intact without any infection other than tinea unguium. .    Follow-up and Dispositions    · Return in about 3 months (around 11/6/2021) for Unless his laboratory is way off and then I will have him come back sooner and start glimepiride.          Beverly Curtis MD

## 2021-08-07 LAB
CHOLEST SERPL-MCNC: 160 MG/DL
CREAT UR-MCNC: 141 MG/DL
EST. AVERAGE GLUCOSE BLD GHB EST-MCNC: 151 MG/DL
HBA1C MFR BLD: 6.9 % (ref 4–5.6)
HDLC SERPL-MCNC: 46 MG/DL
HDLC SERPL: 3.5 {RATIO} (ref 0–5)
LDLC SERPL CALC-MCNC: 67.6 MG/DL (ref 0–100)
MICROALBUMIN UR-MCNC: 3.53 MG/DL
MICROALBUMIN/CREAT UR-RTO: 25 MG/G (ref 0–30)
TRIGL SERPL-MCNC: 232 MG/DL (ref ?–150)
VLDLC SERPL CALC-MCNC: 46.4 MG/DL

## 2022-01-19 DIAGNOSIS — E11.9 CONTROLLED TYPE 2 DIABETES MELLITUS WITHOUT COMPLICATION, WITHOUT LONG-TERM CURRENT USE OF INSULIN (HCC): ICD-10-CM

## 2022-01-19 DIAGNOSIS — I10 ESSENTIAL HYPERTENSION: ICD-10-CM

## 2022-01-19 RX ORDER — METFORMIN HYDROCHLORIDE 500 MG/1
TABLET, EXTENDED RELEASE ORAL
Qty: 30 TABLET | Refills: 0 | Status: SHIPPED | OUTPATIENT
Start: 2022-01-19 | End: 2022-01-26 | Stop reason: SDUPTHER

## 2022-01-19 RX ORDER — METOPROLOL TARTRATE 50 MG/1
50 TABLET ORAL 2 TIMES DAILY
Qty: 30 TABLET | Refills: 0 | Status: SHIPPED | OUTPATIENT
Start: 2022-01-19 | End: 2022-01-26 | Stop reason: SDUPTHER

## 2022-01-26 ENCOUNTER — OFFICE VISIT (OUTPATIENT)
Dept: FAMILY MEDICINE CLINIC | Age: 77
End: 2022-01-26
Payer: OTHER GOVERNMENT

## 2022-01-26 VITALS
HEIGHT: 68 IN | HEART RATE: 61 BPM | WEIGHT: 187.2 LBS | TEMPERATURE: 98.2 F | BODY MASS INDEX: 28.37 KG/M2 | SYSTOLIC BLOOD PRESSURE: 135 MMHG | OXYGEN SATURATION: 98 % | DIASTOLIC BLOOD PRESSURE: 86 MMHG | RESPIRATION RATE: 18 BRPM

## 2022-01-26 DIAGNOSIS — R35.0 BENIGN PROSTATIC HYPERPLASIA WITH URINARY FREQUENCY: ICD-10-CM

## 2022-01-26 DIAGNOSIS — E78.00 HYPERCHOLESTEROLEMIA: ICD-10-CM

## 2022-01-26 DIAGNOSIS — Z12.11 COLON CANCER SCREENING: ICD-10-CM

## 2022-01-26 DIAGNOSIS — E11.9 CONTROLLED TYPE 2 DIABETES MELLITUS WITHOUT COMPLICATION, WITHOUT LONG-TERM CURRENT USE OF INSULIN (HCC): ICD-10-CM

## 2022-01-26 DIAGNOSIS — I10 ESSENTIAL HYPERTENSION: Primary | ICD-10-CM

## 2022-01-26 DIAGNOSIS — I73.9 PERIPHERAL VASCULAR DISEASE (HCC): ICD-10-CM

## 2022-01-26 DIAGNOSIS — Z98.890 HISTORY OF CAROTID ENDARTERECTOMY: ICD-10-CM

## 2022-01-26 DIAGNOSIS — N40.1 BENIGN PROSTATIC HYPERPLASIA WITH URINARY FREQUENCY: ICD-10-CM

## 2022-01-26 PROCEDURE — 99214 OFFICE O/P EST MOD 30 MIN: CPT | Performed by: FAMILY MEDICINE

## 2022-01-26 RX ORDER — TAMSULOSIN HYDROCHLORIDE 0.4 MG/1
0.4 CAPSULE ORAL DAILY
Qty: 90 CAPSULE | Refills: 1 | Status: SHIPPED | OUTPATIENT
Start: 2022-01-26 | End: 2022-07-20

## 2022-01-26 RX ORDER — METFORMIN HYDROCHLORIDE 500 MG/1
TABLET, EXTENDED RELEASE ORAL
Qty: 180 TABLET | Refills: 1 | Status: SHIPPED | OUTPATIENT
Start: 2022-01-26 | End: 2022-10-20

## 2022-01-26 RX ORDER — METOPROLOL TARTRATE 50 MG/1
50 TABLET ORAL 2 TIMES DAILY
Qty: 180 TABLET | Refills: 1 | Status: SHIPPED | OUTPATIENT
Start: 2022-01-26 | End: 2022-05-25

## 2022-01-26 RX ORDER — HYDROCHLOROTHIAZIDE 12.5 MG/1
12.5 TABLET ORAL DAILY
Qty: 90 TABLET | Refills: 1 | Status: SHIPPED | OUTPATIENT
Start: 2022-01-26 | End: 2022-05-25

## 2022-01-26 NOTE — PROGRESS NOTES
1. Have you been to the ER, urgent care clinic since your last visit? Hospitalized since your last visit?no  2. Have you seen or consulted any other health care providers outside of the [address] [place] [address] since your last visit? Include any pap smears or colon screening.  no

## 2022-01-26 NOTE — PROGRESS NOTES
Brenton Benton is a 77 y.o. male who presents with the following:  Chief Complaint   Patient presents with    Diabetes    Hypertension    Cholesterol Problem       Patient has diabetes mellitus. Without polyuria no abnormal weight loss nor any numbness or tingling nor any visual problems. The patient has hypertension without chest pain shortness of breath nor edema. The patient has hypercholesterolemia and has peripheral vascular disease and a carotid endarterectomy and no longer smokes and is looking after his cholesterol with atorvastatin without any muscle pain or weakness. The patient has never had a colonoscopy. No Known Allergies    Current Outpatient Medications   Medication Sig    metoprolol tartrate (LOPRESSOR) 50 mg tablet Take 1 Tablet by mouth two (2) times a day.  metFORMIN ER (GLUCOPHAGE XR) 500 mg tablet 1 twice daily with meals    hydroCHLOROthiazide (HYDRODIURIL) 12.5 mg tablet Take 1 Tablet by mouth daily.  tamsulosin (FLOMAX) 0.4 mg capsule Take 1 Capsule by mouth daily.  amLODIPine (NORVASC) 5 mg tablet Take 1 Tab by mouth daily.  ketoconazole (NIZORAL) 2 % topical cream Apply  to affected area two (2) times a day.  aspirin delayed-release 81 mg tablet Take 1 Tab by mouth daily.  atorvastatin (LIPITOR) 40 mg tablet Take 1 Tab by mouth daily.  Blood-Glucose Meter monitoring kit Use daily as directed to monitor glucose     No current facility-administered medications for this visit.        Past Medical History:   Diagnosis Date    DM (diabetes mellitus) ([address]     HTN (hypertension)     Positive FIT (fecal immunochemical test) 10/7/2019    Stroke [facility])        Past Surgical History:   Procedure Laterality Date    HX CAROTID ENDARTERECTOMY Right 06/2019    HX CAROTID ENDARTERECTOMY Left 11/05/2019       Family History   Problem Relation Age of Onset    Diabetes Father        Social History     Socioeconomic History    Marital status:    Tobacco Use    Smoking status: Former Smoker    Smokeless tobacco: Never Used   Substance and Sexual Activity    Alcohol use: Yes     Alcohol/week: 3.0 standard drinks     Types: 3 Cans of beer per week    Drug use: Never    Sexual activity: Yes       Review of Systems   Constitutional: Negative for chills, fever, malaise/fatigue and weight loss. HENT: Negative for congestion, hearing loss, sore throat and tinnitus. Eyes: Negative for blurred vision, pain and discharge. Respiratory: Negative for cough, shortness of breath and wheezing. Cardiovascular: Negative for chest pain, palpitations, orthopnea, claudication and leg swelling. Gastrointestinal: Negative for abdominal pain, constipation and heartburn. Genitourinary: Negative for dysuria, frequency and urgency. Musculoskeletal: Negative for falls, joint pain and myalgias. Skin: Negative for itching and rash. Neurological: Negative for dizziness, tingling, tremors and headaches. Endo/Heme/Allergies: Negative for environmental allergies and polydipsia. Psychiatric/Behavioral: Negative for depression and substance abuse. The patient is not nervous/anxious. Visit Vitals  /86   Pulse 61   Temp 98.2 °F (36.8 °C)   Resp 18   Ht 5' 8\" (1.727 m)   Wt 187 lb 3.2 oz (84.9 kg)   SpO2 98%   BMI 28.46 kg/m²     Physical Exam  Vitals reviewed. Constitutional:       General: He is not in acute distress. Appearance: Normal appearance. He is not ill-appearing. HENT:      Head: Normocephalic and atraumatic. Right Ear: Tympanic membrane, ear canal and external ear normal.      Left Ear: Tympanic membrane, ear canal and external ear normal.      Nose: Nose normal. No congestion or rhinorrhea. Mouth/Throat:      Mouth: Mucous membranes are moist.      Pharynx: No oropharyngeal exudate or posterior oropharyngeal erythema. Eyes:      Extraocular Movements: Extraocular movements intact.       Conjunctiva/sclera: Conjunctivae normal. Pupils: Pupils are equal, round, and reactive to light. Comments: Pupil iris and anterior chamber normal.   Neck:      Trachea: No tracheal deviation. Cardiovascular:      Rate and Rhythm: Normal rate and regular rhythm. Pulses: Normal pulses. Heart sounds: Normal heart sounds. No murmur heard. No friction rub. No gallop. Pulmonary:      Effort: Pulmonary effort is normal. No respiratory distress. Breath sounds: Normal breath sounds. No wheezing, rhonchi or rales. Chest:      Chest wall: No tenderness. Abdominal:      General: Bowel sounds are normal. There is no distension. Palpations: Abdomen is soft. There is no mass. Tenderness: There is no abdominal tenderness. There is no guarding or rebound. Hernia: No hernia is present. Genitourinary:     Penis: Normal.       Testes: Normal.      Rectum: Normal.      Comments: Patient is noted to have a diffusely enlarged prostate that is smooth without lumps or hard areas and the texture is normal.  Musculoskeletal:         General: No tenderness. Normal range of motion. Cervical back: Normal range of motion and neck supple. Right lower leg: No edema. Left lower leg: No edema. Lymphadenopathy:      Cervical: No cervical adenopathy. Skin:     General: Skin is warm and dry. Findings: No erythema or rash. Neurological:      General: No focal deficit present. Mental Status: He is alert and oriented to person, place, and time. Cranial Nerves: No cranial nerve deficit. Motor: No abnormal muscle tone. Deep Tendon Reflexes: Reflexes are normal and symmetric. Reflexes normal.      Comments: Cranial nerves II through XII intact sensory and motor. Deep tendon reflexes in the biceps triceps knee and ankle are normal and bilaterally symmetrical.   Psychiatric:         Mood and Affect: Mood normal.         Behavior: Behavior normal.         Thought Content:  Thought content normal. Judgment: Judgment normal.           ICD-10-CM ICD-9-CM    1. Essential hypertension  I10 401.9 metoprolol tartrate (LOPRESSOR) 50 mg tablet      hydroCHLOROthiazide (HYDRODIURIL) 12.5 mg tablet   2. Peripheral vascular disease (HCC)  I73.9 443.9    3. Controlled type 2 diabetes mellitus without complication, without long-term current use of insulin (HCC)  E11.9 250.00 metFORMIN ER (GLUCOPHAGE XR) 500 mg tablet      LIPID PANEL      METABOLIC PANEL, COMPREHENSIVE      HEMOGLOBIN A1C WITH EAG      MICROALBUMIN, UR, RAND W/ MICROALB/CREAT RATIO      COLLECTION VENOUS BLOOD,VENIPUNCTURE   4. Hypercholesterolemia  E78.00 272.0    5. History of carotid endarterectomy  Z98.890 V45.89    6. Benign prostatic hyperplasia with urinary frequency  N40.1 600.01 tamsulosin (FLOMAX) 0.4 mg capsule    R35.0 788.41 PSA W/ REFLX FREE PSA   7. Colon cancer screening  Z12.11 V76.51 REFERRAL TO SURGERY       Orders Placed This Encounter    PSA W/ REFLX FREE PSA     Standing Status:   Future     Standing Expiration Date:   2023    LIPID PANEL     Standing Status:   Future     Standing Expiration Date:       METABOLIC PANEL, COMPREHENSIVE     Standing Status:   Future     Standing Expiration Date:   2022    HEMOGLOBIN A1C WITH EAG     Standing Status:   Future     Standing Expiration Date:   2022    MICROALBUMIN, UR, RAND W/ MICROALB/CREAT RATIO     Standing Status:   Future     Standing Expiration Date:   2022    REFERRAL TO SURGERY     Referral Priority:   Routine     Referral Type:   Consultation     Referral Reason:   Specialty Services Required     Referred to Provider:   Mane Teixeira MD     Number of Visits Requested:   1    COLLECTION VENOUS BLOOD,VENIPUNCTURE    metoprolol tartrate (LOPRESSOR) 50 mg tablet     Sig: Take 1 Tablet by mouth two (2) times a day.      Dispense:  180 Tablet     Refill:  1    metFORMIN ER (GLUCOPHAGE XR) 500 mg tablet     Si twice daily with meals Dispense:  180 Tablet     Refill:  1    hydroCHLOROthiazide (HYDRODIURIL) 12.5 mg tablet     Sig: Take 1 Tablet by mouth daily. Dispense:  90 Tablet     Refill:  1    tamsulosin (FLOMAX) 0.4 mg capsule     Sig: Take 1 Capsule by mouth daily. Dispense:  90 Capsule     Refill:  1       Follow-up and Dispositions    · Return in about 6 months (around 7/26/2022).          Juan Eng MD

## 2022-01-27 LAB
ALBUMIN SERPL-MCNC: 3.8 G/DL (ref 3.5–5)
ALBUMIN/GLOB SERPL: 1.3 {RATIO} (ref 1.1–2.2)
ALP SERPL-CCNC: 80 U/L (ref 45–117)
ALT SERPL-CCNC: 36 U/L (ref 12–78)
ANION GAP SERPL CALC-SCNC: 6 MMOL/L (ref 5–15)
AST SERPL-CCNC: 23 U/L (ref 15–37)
BILIRUB SERPL-MCNC: 0.7 MG/DL (ref 0.2–1)
BUN SERPL-MCNC: 17 MG/DL (ref 6–20)
BUN/CREAT SERPL: 15 (ref 12–20)
CALCIUM SERPL-MCNC: 9.2 MG/DL (ref 8.5–10.1)
CHLORIDE SERPL-SCNC: 102 MMOL/L (ref 97–108)
CHOLEST SERPL-MCNC: 139 MG/DL
CO2 SERPL-SCNC: 28 MMOL/L (ref 21–32)
CREAT SERPL-MCNC: 1.1 MG/DL (ref 0.7–1.3)
CREAT UR-MCNC: 152 MG/DL
EST. AVERAGE GLUCOSE BLD GHB EST-MCNC: 189 MG/DL
GLOBULIN SER CALC-MCNC: 2.9 G/DL (ref 2–4)
GLUCOSE SERPL-MCNC: 160 MG/DL (ref 65–100)
HBA1C MFR BLD: 8.2 % (ref 4–5.6)
HDLC SERPL-MCNC: 48 MG/DL
HDLC SERPL: 2.9 {RATIO} (ref 0–5)
LDLC SERPL CALC-MCNC: 62 MG/DL (ref 0–100)
MICROALBUMIN UR-MCNC: 4.46 MG/DL
MICROALBUMIN/CREAT UR-RTO: 29 MG/G (ref 0–30)
POTASSIUM SERPL-SCNC: 4.1 MMOL/L (ref 3.5–5.1)
PROT SERPL-MCNC: 6.7 G/DL (ref 6.4–8.2)
SODIUM SERPL-SCNC: 136 MMOL/L (ref 136–145)
TRIGL SERPL-MCNC: 145 MG/DL (ref ?–150)
VLDLC SERPL CALC-MCNC: 29 MG/DL

## 2022-01-28 LAB
PSA SERPL-MCNC: 1.5 NG/ML (ref 0–4)
REFLEX CRITERIA: NORMAL

## 2022-02-18 ENCOUNTER — TELEPHONE (OUTPATIENT)
Dept: FAMILY MEDICINE CLINIC | Age: 77
End: 2022-02-18

## 2022-02-18 DIAGNOSIS — I10 ESSENTIAL HYPERTENSION: ICD-10-CM

## 2022-02-18 RX ORDER — AMLODIPINE BESYLATE 5 MG/1
TABLET ORAL
Qty: 90 TABLET | Refills: 1 | Status: SHIPPED | OUTPATIENT
Start: 2022-02-18 | End: 2022-10-20 | Stop reason: SDUPTHER

## 2022-02-18 NOTE — TELEPHONE ENCOUNTER
----- Message from Richardrosario Brenda sent at 2/18/2022 12:57 PM EST -----  Subject: Appointment Request    Reason for Call: Routine Existing Condition Follow Up    QUESTIONS  Type of Appointment? Established Patient  Reason for appointment request? Available appointments did not meet   patient need  Additional Information for Provider? The pts spouse Christian Sender called to   reschedule the pts appt to April, all appts from April and May are vv   appts and pt cant do they because he has blood work. Please call the   spouse and let her know when he can come in person. ---------------------------------------------------------------------------  --------------  Genevieve Yan INFO  What is the best way for the office to contact you? OK to leave message on   voicemail  Preferred Call Back Phone Number? [phone]  ---------------------------------------------------------------------------  --------------  SCRIPT ANSWERS  Relationship to Patient? Other  Representative Name? Concepcion  Additional information verified (besides Name and Date of Birth)? Address  Have your symptoms changed? No  Is this follow up request related to routine Diabetes Management? No  Have you been diagnosed with, awaiting test results for, or told that you   are suspected of having COVID-19 (Coronavirus)? (If patient has tested   negative or was tested as a requirement for work, school, or travel and   not based on symptoms, answer no)? No  Within the past 10 days have you developed any of the following symptoms   (answer no if symptoms have been present longer than 10 days or began   more than 10 days ago)? Fever or Chills, Cough, Shortness of breath or   difficulty breathing, Loss of taste or smell, Sore throat, Nasal   congestion, Sneezing or runny nose, Fatigue or generalized body aches   (answer no if pain is specific to a body part e.g. back pain), Diarrhea,   Headache? No  Have you had close contact with someone with COVID-19 in the last 7 days? No  (Service Expert  click yes below to proceed with [facility] As Usual   Scheduling)?  Yes

## 2022-02-18 NOTE — TELEPHONE ENCOUNTER
Patient requesting refill on     Requested Prescriptions     Pending Prescriptions Disp Refills    amLODIPine (NORVASC) 5 mg tablet 90 Tablet 1     Sig: Take 1 Tab by mouth daily.          Last OV 1/26/2022

## 2022-02-18 NOTE — TELEPHONE ENCOUNTER
----- Message from Nazario Snyder sent at 2/18/2022 12:59 PM EST -----  Subject: Refill Request    QUESTIONS  Name of Medication? amLODIPine (NORVASC) 5 mg tablet  Patient-reported dosage and instructions? 1/2 a pill in the morning  How many days do you have left? 7  Preferred Pharmacy? [id number]  Pharmacy phone number (if available)? [phone]  ---------------------------------------------------------------------------  --------------  CALL BACK INFO  What is the best way for the office to contact you? OK to leave message on   voicemail  Preferred Call Back Phone Number?  [phone]

## 2022-03-18 PROBLEM — B35.1 TINEA UNGUIUM: Status: ACTIVE | Noted: 2020-07-10

## 2022-03-18 PROBLEM — Z87.891 FORMER SMOKER: Status: ACTIVE | Noted: 2019-11-08

## 2022-03-18 PROBLEM — E78.00 HYPERCHOLESTEROLEMIA: Status: ACTIVE | Noted: 2019-07-18

## 2022-03-19 PROBLEM — I73.9 PERIPHERAL VASCULAR DISEASE (HCC): Status: ACTIVE | Noted: 2019-07-18

## 2022-03-19 PROBLEM — E11.9 CONTROLLED TYPE 2 DIABETES MELLITUS WITHOUT COMPLICATION, WITHOUT LONG-TERM CURRENT USE OF INSULIN (HCC): Status: ACTIVE | Noted: 2019-07-18

## 2022-03-19 PROBLEM — Z98.890 HISTORY OF CAROTID ENDARTERECTOMY: Status: ACTIVE | Noted: 2019-07-18

## 2022-03-19 PROBLEM — N40.1 BENIGN PROSTATIC HYPERPLASIA WITH URINARY FREQUENCY: Status: ACTIVE | Noted: 2022-01-26

## 2022-03-19 PROBLEM — R35.0 BENIGN PROSTATIC HYPERPLASIA WITH URINARY FREQUENCY: Status: ACTIVE | Noted: 2022-01-26

## 2022-03-20 PROBLEM — I10 ESSENTIAL HYPERTENSION: Status: ACTIVE | Noted: 2019-07-18

## 2022-04-08 ENCOUNTER — OFFICE VISIT (OUTPATIENT)
Dept: FAMILY MEDICINE CLINIC | Age: 77
End: 2022-04-08
Payer: OTHER GOVERNMENT

## 2022-04-08 VITALS
RESPIRATION RATE: 20 BRPM | BODY MASS INDEX: 27.46 KG/M2 | HEIGHT: 68 IN | SYSTOLIC BLOOD PRESSURE: 172 MMHG | WEIGHT: 181.2 LBS | OXYGEN SATURATION: 94 % | DIASTOLIC BLOOD PRESSURE: 86 MMHG | HEART RATE: 76 BPM | TEMPERATURE: 98.2 F

## 2022-04-08 DIAGNOSIS — I10 ESSENTIAL HYPERTENSION: ICD-10-CM

## 2022-04-08 DIAGNOSIS — Z98.890 HISTORY OF CAROTID ENDARTERECTOMY: ICD-10-CM

## 2022-04-08 DIAGNOSIS — R35.0 BENIGN PROSTATIC HYPERPLASIA WITH URINARY FREQUENCY: ICD-10-CM

## 2022-04-08 DIAGNOSIS — Z86.73 CEREBELLAR CEREBROVASCULAR ACCIDENT (CVA) WITHOUT LATE EFFECT: Primary | ICD-10-CM

## 2022-04-08 DIAGNOSIS — I73.9 PERIPHERAL VASCULAR DISEASE (HCC): ICD-10-CM

## 2022-04-08 DIAGNOSIS — N40.1 BENIGN PROSTATIC HYPERPLASIA WITH URINARY FREQUENCY: ICD-10-CM

## 2022-04-08 DIAGNOSIS — E78.00 HYPERCHOLESTEROLEMIA: ICD-10-CM

## 2022-04-08 DIAGNOSIS — Z87.891 FORMER SMOKER: ICD-10-CM

## 2022-04-08 DIAGNOSIS — E11.9 CONTROLLED TYPE 2 DIABETES MELLITUS WITHOUT COMPLICATION, WITHOUT LONG-TERM CURRENT USE OF INSULIN (HCC): ICD-10-CM

## 2022-04-08 PROCEDURE — 99214 OFFICE O/P EST MOD 30 MIN: CPT | Performed by: FAMILY MEDICINE

## 2022-04-08 PROCEDURE — 3052F HG A1C>EQUAL 8.0%<EQUAL 9.0%: CPT | Performed by: FAMILY MEDICINE

## 2022-04-08 NOTE — PROGRESS NOTES
Radha Regalado is a 77 y.o. male who presents with the following:  Chief Complaint   Patient presents with    Dizziness     with difficulty walking     Nausea     vomiting today       Patient started 4 days ago with dizziness and difficulty in walking a straight line. The patient states that he is constantly veering toward his left and then today he developed nausea and vomiting but is not having any headache. The patient has elevated blood pressure today and states that his blood pressure normally runs in the 150s over 80s but today he did not take his blood pressure medicine other than the metoprolol this morning and he has not taken the afternoon dose. The patient states he has no difficulty with vision nor hearing and he feels like his strength is normal bilateral.  Patient has a past history of having had carotid surgery to relieve carotid artery stenosis. I told the patient and his wife I [facility] said [facility] were not sure that the [facility] would pay for it and I told him it would be best then to go to the [facility] in [place] to be evaluated. I told him as an option I would send him over to \[facility]\"" for an evaluation but I thought that he needed to have a CT scan or an MRI as soon as possible. The patient stated his preference for going to the [facility] on Monday because he said they would not do anything over the weekend. I told the patient and his wife that it been going on for 4 days but there is no guarantee that it would stay stable for 3 more days. The patient has a history of essential hypertension and diabetes mellitus and hyperlipidemia and has had to have a carotid endarterectomy and has peripheral vascular disease and is a former smoker. Patient also has benign prostatic hyperplasia with urinary frequency. No Known Allergies    Current Outpatient Medications   Medication Sig    amLODIPine (NORVASC) 5 mg tablet Take 1 Tab by mouth daily.  metoprolol tartrate (LOPRESSOR) 50 mg tablet Take 1 Tablet by mouth two (2) times a day.  metFORMIN ER (GLUCOPHAGE XR) 500 mg tablet 1 twice daily with meals    hydroCHLOROthiazide (HYDRODIURIL) 12.5 mg tablet Take 1 Tablet by mouth daily.  tamsulosin (FLOMAX) 0.4 mg capsule Take 1 Capsule by mouth daily.  ketoconazole (NIZORAL) 2 % topical cream Apply  to affected area two (2) times a day.  aspirin delayed-release 81 mg tablet Take 1 Tab by mouth daily.  atorvastatin (LIPITOR) 40 mg tablet Take 1 Tab by mouth daily.  Blood-Glucose Meter monitoring kit Use daily as directed to monitor glucose     No current facility-administered medications for this visit. Past Medical History:   Diagnosis Date    DM (diabetes mellitus) ([facility] [address]     HTN (hypertension)     Positive FIT (fecal immunochemical test) 10/7/2019    Stroke [facility])        Past Surgical History:   Procedure Laterality Date    HX CAROTID ENDARTERECTOMY Right 06/2019    HX CAROTID ENDARTERECTOMY Left 11/05/2019       Family History   Problem Relation Age of Onset    Diabetes Father        Social History     Socioeconomic History    Marital status:    Tobacco Use    Smoking status: Former Smoker    Smokeless tobacco: Never Used   Substance and Sexual Activity    Alcohol use: Yes     Alcohol/week: 3.0 standard drinks     Types: 3 Cans of beer per week    Drug use: Never    Sexual activity: Yes       Review of Systems   Constitutional: Negative for chills, fever, malaise/fatigue and weight loss. HENT: Negative for congestion, hearing loss, sore throat and tinnitus. Eyes: Negative for blurred vision, pain and discharge. Respiratory: Negative for cough, shortness of breath and wheezing. Cardiovascular: Negative for chest pain, palpitations, orthopnea, claudication and leg swelling. Gastrointestinal: Positive for nausea and vomiting. Negative for abdominal pain, constipation and heartburn.    Genitourinary: Negative for dysuria, frequency and urgency. Musculoskeletal: Negative for falls, joint pain and myalgias. Skin: Negative for itching and rash. Neurological: Positive for dizziness. Negative for tingling, tremors and headaches. Consistently veering off toward the left when he is trying to walk straight. Endo/Heme/Allergies: Negative for environmental allergies and polydipsia. Psychiatric/Behavioral: Negative for depression and substance abuse. The patient is not nervous/anxious. Visit Vitals  BP (!) 172/86 (BP 1 Location: Left arm)   Pulse 76   Temp 98.2 °F (36.8 °C)   Resp 20   Ht 5' 8\" (1.727 m)   Wt 181 lb 3.2 oz (82.2 kg)   SpO2 94%   BMI 27.55 kg/m²     Physical Exam  Vitals reviewed. Constitutional:       General: He is not in acute distress. Appearance: Normal appearance. He is not ill-appearing. HENT:      Head: Normocephalic and atraumatic. Right Ear: Tympanic membrane, ear canal and external ear normal.      Left Ear: Tympanic membrane, ear canal and external ear normal.      Nose: Nose normal. No congestion or rhinorrhea. Mouth/Throat:      Mouth: Mucous membranes are moist.      Pharynx: No oropharyngeal exudate or posterior oropharyngeal erythema. Eyes:      Extraocular Movements: Extraocular movements intact. Conjunctiva/sclera: Conjunctivae normal.      Pupils: Pupils are equal, round, and reactive to light. Comments: Pupil iris and anterior chamber normal.   Neck:      Trachea: No tracheal deviation. Cardiovascular:      Rate and Rhythm: Normal rate and regular rhythm. Pulses: Normal pulses. Heart sounds: Normal heart sounds. No murmur heard. No friction rub. No gallop. Pulmonary:      Effort: Pulmonary effort is normal. No respiratory distress. Breath sounds: Normal breath sounds. No wheezing, rhonchi or rales. Chest:      Chest wall: No tenderness. Abdominal:      General: Bowel sounds are normal. There is no distension. Palpations: Abdomen is soft. There is no mass. Tenderness: There is no abdominal tenderness. There is no guarding or rebound. Hernia: No hernia is present. Musculoskeletal:         General: No tenderness. Normal range of motion. Cervical back: Normal range of motion and neck supple. Right lower leg: No edema. Left lower leg: No edema. Lymphadenopathy:      Cervical: No cervical adenopathy. Skin:     General: Skin is warm and dry. Findings: No erythema or rash. Neurological:      General: No focal deficit present. Mental Status: He is alert and oriented to person, place, and time. Cranial Nerves: No cranial nerve deficit. Sensory: No sensory deficit. Motor: No weakness or abnormal muscle tone. Gait: Gait abnormal (Peers to the left). Deep Tendon Reflexes: Reflexes are normal and symmetric. Reflexes normal.      Comments: Cranial nerves II through XII intact sensory and motor. Deep tendon reflexes in the biceps triceps knee and ankle are normal and bilaterally symmetrical.   Psychiatric:         Mood and Affect: Mood normal.         Behavior: Behavior normal.         Thought Content: Thought content normal.         Judgment: Judgment normal.           ICD-10-CM ICD-9-CM    1. Cerebellar cerebrovascular accident (CVA) without late effect  Z86.73 V12.54    2. Essential hypertension  I10 401.9    3. Peripheral vascular disease (HCC)  I73.9 443.9    4. Controlled type 2 diabetes mellitus without complication, without long-term current use of insulin (HCC)  E11.9 250.00    5. Benign prostatic hyperplasia with urinary frequency  N40.1 600.01     R35.0 788.41    6. Former smoker  Z87.891 V15.82    7. History of carotid endarterectomy  Z98.890 V45.89    8. Hypercholesterolemia  E78.00 272.0        No orders of the defined types were placed in this encounter.   I told the patient I thought it best that he go to the emergency room tonight whether it be at [facility] or [facility] in [place] but the patient stated his preference for waiting until Monday AMA. Follow-up and Dispositions    · Return if symptoms worsen or fail to improve.          Ruperto Candelaria MD

## 2022-04-11 PROBLEM — Z86.73 CEREBELLAR CEREBROVASCULAR ACCIDENT (CVA) WITHOUT LATE EFFECT: Status: ACTIVE | Noted: 2022-04-08

## 2022-05-25 ENCOUNTER — OFFICE VISIT (OUTPATIENT)
Dept: FAMILY MEDICINE CLINIC | Age: 77
End: 2022-05-25
Payer: MEDICARE

## 2022-05-25 ENCOUNTER — HOSPITAL ENCOUNTER (OUTPATIENT)
Dept: PHYSICAL THERAPY | Age: 77
Discharge: HOME | End: 2022-05-25
Payer: MEDICARE

## 2022-05-25 VITALS
OXYGEN SATURATION: 97 % | DIASTOLIC BLOOD PRESSURE: 65 MMHG | WEIGHT: 173 LBS | BODY MASS INDEX: 26.22 KG/M2 | RESPIRATION RATE: 18 BRPM | TEMPERATURE: 97.8 F | HEART RATE: 84 BPM | SYSTOLIC BLOOD PRESSURE: 110 MMHG | HEIGHT: 68 IN

## 2022-05-25 DIAGNOSIS — Z98.890 HISTORY OF CAROTID ENDARTERECTOMY: ICD-10-CM

## 2022-05-25 DIAGNOSIS — R35.0 BENIGN PROSTATIC HYPERPLASIA WITH URINARY FREQUENCY: ICD-10-CM

## 2022-05-25 DIAGNOSIS — N40.1 BENIGN PROSTATIC HYPERPLASIA WITH URINARY FREQUENCY: ICD-10-CM

## 2022-05-25 DIAGNOSIS — I63.541 CEREBROVASCULAR ACCIDENT (CVA) DUE TO STENOSIS OF RIGHT CEREBELLAR ARTERY (HCC): Primary | ICD-10-CM

## 2022-05-25 DIAGNOSIS — E78.00 HYPERCHOLESTEROLEMIA: ICD-10-CM

## 2022-05-25 DIAGNOSIS — I10 ESSENTIAL HYPERTENSION: ICD-10-CM

## 2022-05-25 DIAGNOSIS — E11.9 CONTROLLED TYPE 2 DIABETES MELLITUS WITHOUT COMPLICATION, WITHOUT LONG-TERM CURRENT USE OF INSULIN (HCC): ICD-10-CM

## 2022-05-25 DIAGNOSIS — I73.9 PERIPHERAL VASCULAR DISEASE (HCC): ICD-10-CM

## 2022-05-25 PROCEDURE — 97165 OT EVAL LOW COMPLEX 30 MIN: CPT

## 2022-05-25 PROCEDURE — 97110 THERAPEUTIC EXERCISES: CPT

## 2022-05-25 PROCEDURE — 3052F HG A1C>EQUAL 8.0%<EQUAL 9.0%: CPT | Performed by: FAMILY MEDICINE

## 2022-05-25 PROCEDURE — 1123F ACP DISCUSS/DSCN MKR DOCD: CPT | Performed by: FAMILY MEDICINE

## 2022-05-25 PROCEDURE — 99214 OFFICE O/P EST MOD 30 MIN: CPT | Performed by: FAMILY MEDICINE

## 2022-05-25 RX ORDER — GABAPENTIN 300 MG/1
CAPSULE ORAL
COMMUNITY
Start: 2022-05-18

## 2022-05-25 RX ORDER — FAMOTIDINE 20 MG/1
20 TABLET, FILM COATED ORAL
COMMUNITY
Start: 2022-05-18 | End: 2022-10-20

## 2022-05-25 RX ORDER — MECLIZINE HCL 12.5 MG 12.5 MG/1
12.5 TABLET ORAL
COMMUNITY
Start: 2022-04-26 | End: 2022-05-26

## 2022-05-25 RX ORDER — SENNOSIDES 8.6 MG/1
TABLET ORAL
COMMUNITY
Start: 2022-05-18

## 2022-05-25 RX ORDER — CLOPIDOGREL BISULFATE 75 MG/1
75 TABLET ORAL DAILY
COMMUNITY
Start: 2022-04-26 | End: 2022-07-20 | Stop reason: ALTCHOICE

## 2022-05-25 NOTE — PROGRESS NOTES
OT INITIAL EVALUATION NOTE - [facility] 2-15    Patient Name: Carson Ram  Date:2022  : 1945  [x]  Patient  Verified  Payor: /   In time:1440 Out time:1529  Total Treatment Time (min): 49  Total Timed Codes (min): 49  1:1 Treatment Time ( [address] only): 52   Visit #: 1    Treatment Area: Muscle weakness (generalized) [M62.81]    SUBJECTIVE  Pain Level (0-10 scale): 0/10   Any medication changes, allergies to medications, adverse drug reactions, diagnosis change, or new procedure performed?: [] No    [x] Yes (see summary sheet for update)  Subjective:  Pt is unclear on when his CVA was. Wife present. He is able to remember he hit his head 3-4 months ago and felt this was why he was having his difficulty. 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 He did not need ambulatory aid prior and is now using a walker but needs someone with him.    PLOF: independent in all ADL/IADL including working on motorcycles  Mechanism of Injury: CVA  Previous Treatment/Compliance: in-patient rehab 2-3 weeks per pt  PMHx/Surgical Hx: hx of DM HBP  Work Hx: retired  Living Situation: lives with wife  Pt Goals: to get his R hand working  Barriers: none  Motivation: good  Substance use: none reported  Cognition: A & O x 3, not clear on CVA details       OBJECTIVE/EXAMINATION    23 min Therapeutic Exercise:  [] See flow sheet : practiced with fine motor activites including cotton ball inhand manipulation; red putty with instruction for HEP pinches and ; small beads and ; pencil movement up/down; shoulder AROM slow/controlled, ed on sensory bucket   Rationale: increase ROM, increase strength, improve coordination and increase proprioception to improve the patients ability to use R hand                With   [x] TE   [] TA   [] neuro   [] other: Patient Education: [x] Review HEP  Red putty, sensory bucket recommended; small beads and  given; cotton balls  [] Progressed/Changed HEP based on:   [] positioning   [] body mechanics   [] transfers   [] heat/ice application    [] other:      Other Objective/Functional Measures  AROM in fingers and elbow and sholder all full on the R   R 34lbs   L 70lbs  Lateral pinch R 16.6; L 19lbs  Tripod pinch 5lbs R and slipped of: L 15lbs  Tip pinch 10lbs R and L hand 13lbs  Monoflimanet test:  L hand 3.22  R hand index, ring and small finger 3.81; 4.08 middle finger and 4.56 thumb    9 hole peg test 2:05 R 33 sec L    MMT: R shoulder flexion 3+/5; abduction 3/5; bicep 3+/5; 3+/5  Pain Level (0-10 scale) post treatment: none currently    ASSESSMENT/Changes in Function:   Pt has residual impact of CVA. He is impulsive with ambulation and needs someone with him. He drifts walker into door on the right; he made a quick turn, lifted walker and moved requiring assist to maintain balance. He had difficulty with transitioning into chair and again needed assist to remain upright. He has functional AROM in hands and shoulder but coordination is lacking. Sensation is impaired in R hand, strength is diminished throughout RUE.   [x]  See Plan of [address], [place] 5/25/2022

## 2022-05-25 NOTE — PROGRESS NOTES
Romeo Barajas is a 77 y.o. male who presents with the following:  Chief Complaint   Patient presents with    Follow-up     from cva       Patient with a history of essential hypertension and peripheral vascular disease and now has the late effects of cerebellar cerebrovascular accident which has affected his coordination as well as having some weakness on the right side of his body. The patient's diabetes seems to be doing fairly well at this time as is his essential hypertension without any chest pain shortness of breath nor edema. The patient is having no polyuria nor abnormal weight loss from his diabetes which is remained controlled. Patient also has hypercholesterolemia but is not suffering any claudication but he is now currently on Plavix for these problems as well as gabapentin as he seems to be suffering from a nerve problem in his right shoulder that radiates up his arm through the deltoid over toward his neck. Patient is now also on Pepcid to protect his stomach as he is on Plavix and is on meclizine for the vertiginous symptoms he is having from his cerebellar CVA. Patient does have a history of BPH with urinary frequency and his tamsulosin has been stopped because his blood pressure has been dropping it periods of time throughout the day. No Known Allergies    Current Outpatient Medications   Medication Sig    meclizine (ANTIVERT) 12.5 mg tablet Take 12.5 mg by mouth.  clopidogreL (PLAVIX) 75 mg tab Take 75 mg by mouth daily.  amLODIPine (NORVASC) 5 mg tablet Take 1 Tab by mouth daily. (Patient taking differently: 10 mg. Take 1 Tab by mouth daily.)    metFORMIN ER (GLUCOPHAGE XR) 500 mg tablet 1 twice daily with meals (Patient taking differently: 500 mg. 2 tables twice daily)    tamsulosin (FLOMAX) 0.4 mg capsule Take 1 Capsule by mouth daily.  ketoconazole (NIZORAL) 2 % topical cream Apply  to affected area two (2) times a day.     aspirin delayed-release 81 mg tablet Take 1 Tab by mouth daily.  atorvastatin (LIPITOR) 40 mg tablet Take 1 Tab by mouth daily.  Blood-Glucose Meter monitoring kit Use daily as directed to monitor glucose    gabapentin (NEURONTIN) 300 mg capsule TAKE 1 CAPSULE 3 TIMES A DAY    famotidine (PEPCID) 20 mg tablet Take 20 mg by mouth nightly.  Senna Laxative 8.6 mg tablet TAKE 1 TABLET TWICE A DAY AS NEEDED FOR CONSTIPATION     No current facility-administered medications for this visit. Past Medical History:   Diagnosis Date    DM (diabetes mellitus) ([facility]     HTN (hypertension)     Positive FIT (fecal immunochemical test) 10/7/2019    Stroke [facility])        Past Surgical History:   Procedure Laterality Date    HX CAROTID ENDARTERECTOMY Right 06/2019    HX CAROTID ENDARTERECTOMY Left 11/05/2019       Family History   Problem Relation Age of Onset    Diabetes Father        Social History     Socioeconomic History    Marital status:    Tobacco Use    Smoking status: Former Smoker    Smokeless tobacco: Never Used   Substance and Sexual Activity    Alcohol use: Yes     Alcohol/week: 3.0 standard drinks     Types: 3 Cans of beer per week    Drug use: Never    Sexual activity: Yes       Review of Systems   Constitutional: Negative for chills, fever, malaise/fatigue and weight loss. HENT: Negative for congestion, hearing loss, sore throat and tinnitus. Eyes: Negative for blurred vision, pain and discharge. Respiratory: Negative for cough, shortness of breath and wheezing. Cardiovascular: Negative for chest pain, palpitations, orthopnea, claudication and leg swelling. Gastrointestinal: Negative for abdominal pain, constipation and heartburn. Genitourinary: Negative for dysuria, frequency and urgency. Musculoskeletal: Negative for falls, joint pain and myalgias. Skin: Negative for itching and rash. Neurological: Positive for dizziness, tingling, sensory change, focal weakness and weakness. Negative for tremors and headaches. Endo/Heme/Allergies: Negative for environmental allergies and polydipsia. Psychiatric/Behavioral: Negative for depression and substance abuse. The patient is not nervous/anxious. Visit Vitals  /65   Pulse 84   Temp 97.8 °F (36.6 °C)   Resp 18   Ht 5' 8\" (1.727 m)   Wt 173 lb (78.5 kg)   SpO2 97%   BMI 26.30 kg/m²     Physical Exam  Vitals reviewed. Constitutional:       General: He is not in acute distress. Appearance: Normal appearance. He is not ill-appearing. HENT:      Head: Normocephalic and atraumatic. Right Ear: Tympanic membrane, ear canal and external ear normal.      Left Ear: Tympanic membrane, ear canal and external ear normal.      Nose: Nose normal. No congestion or rhinorrhea. Mouth/Throat:      Mouth: Mucous membranes are moist.      Pharynx: No oropharyngeal exudate or posterior oropharyngeal erythema. Eyes:      Extraocular Movements: Extraocular movements intact. Conjunctiva/sclera: Conjunctivae normal.      Pupils: Pupils are equal, round, and reactive to light. Comments: Pupil iris and anterior chamber normal.   Neck:      Trachea: No tracheal deviation. Cardiovascular:      Rate and Rhythm: Normal rate and regular rhythm. Pulses: Normal pulses. Heart sounds: Normal heart sounds. No murmur heard. No friction rub. No gallop. Pulmonary:      Effort: Pulmonary effort is normal. No respiratory distress. Breath sounds: Normal breath sounds. No wheezing, rhonchi or rales. Chest:      Chest wall: No tenderness. Abdominal:      General: Bowel sounds are normal. There is no distension. Palpations: Abdomen is soft. There is no mass. Tenderness: There is no abdominal tenderness. There is no guarding or rebound. Hernia: No hernia is present. Musculoskeletal:         General: No tenderness. Normal range of motion. Cervical back: Normal range of motion and neck supple. Right lower leg: No edema.       Left lower leg: No edema. Lymphadenopathy:      Cervical: No cervical adenopathy. Skin:     General: Skin is warm and dry. Findings: No erythema or rash. Neurological:      General: No focal deficit present. Mental Status: He is alert and oriented to person, place, and time. Cranial Nerves: No cranial nerve deficit. Sensory: Sensory deficit present. Motor: Weakness present. No abnormal muscle tone. Coordination: Coordination abnormal.      Gait: Gait abnormal.      Deep Tendon Reflexes: Reflexes are normal and symmetric. Reflexes normal.      Comments: Cranial nerves II through XII intact sensory and motor. Deep tendon reflexes in the biceps triceps knee and ankle are normal and bilaterally symmetrical.  The patient still needs to use a walker to keep him stable or either have someone walk next to him with their hand on his support belt to prevent falls. Psychiatric:         Mood and Affect: Mood normal.         Behavior: Behavior normal.         Thought Content: Thought content normal.         Judgment: Judgment normal.           ICD-10-CM ICD-9-CM    1. Cerebrovascular accident (CVA) due to stenosis of right cerebellar artery ([facility]  I63.541 434.91    2. Essential hypertension  I10 401.9    3. Peripheral vascular disease (HCC)  I73.9 443.9    4. Controlled type 2 diabetes mellitus without complication, without long-term current use of insulin (HCC)  E11.9 250.00    5. Benign prostatic hyperplasia with urinary frequency  N40.1 600.01     R35.0 788.41    6. Hypercholesterolemia  E78.00 272.0    7. History of carotid endarterectomy  Z98.890 V45.89        Orders Placed This Encounter    gabapentin (NEURONTIN) 300 mg capsule     Sig: TAKE 1 CAPSULE 3 TIMES A DAY    famotidine (PEPCID) 20 mg tablet     Sig: Take 20 mg by mouth nightly.  meclizine (ANTIVERT) 12.5 mg tablet     Sig: Take 12.5 mg by mouth.     Senna Laxative 8.6 mg tablet     Sig: TAKE 1 TABLET TWICE A DAY AS NEEDED FOR CONSTIPATION    clopidogreL (PLAVIX) 75 mg tab     Sig: Take 75 mg by mouth daily. Other than discontinuing the tamsulosin temporarily the patient should remain on his usual medications as is and we would recheck him back in about 6 weeks    Follow-up and Dispositions    · Return in about 6 weeks (around 7/6/2022).          Tone Davenport MD

## 2022-05-25 NOTE — PROGRESS NOTES
[facility]  [address], [place], [address] [facility]:  [phone]  FAX: [phone]    Plan of Care/Statement of Necessity for Physical Therapy Services  2-15    Patient name: Howard Snyder  : 1945  Provider#: [id number]  Referral source: Juaquin Fong MD      Medical/Treatment Diagnosis: Muscle weakness (generalized) [M62.81]     Prior Hospitalization: see medical history     Comorbidities:HBP, DM  Prior Level of Function: independent w/o mobility aid. Has motorcycles and does  work  Medications: Verified on Patient Summary List  Start of Care: 22    Onset Date: 22   The Plan of Care and following information is based on the information from the initial evaluation. Assessment/ key information:  He is impulsive with ambulation and needs someone with him. He drifts walker into door on the right; he made a quick turn, lifted walker and moved requiring assist to maintain balance. He had difficulty with transitioning into chair and again needed assist to remain upright. He has functional AROM in hands and shoulder but coordination is lacking. Sensation is impaired in R hand, strength is diminished throughout RUE. Pt complains of quick burning pain between the R shoulder and his ear that happens w/o warning and lasts up to 20 seconds. He's not attempted any temp relief like ice or heat and has not identified a pattern or antecedent movement that might be a contributor. Will monitor shoulder pain.       Evaluation Complexity History MEDIUM  Complexity : 1-2 comorbidities / personal factors will impact the outcome/ POC ; Examination MEDIUM Complexity : 3 Standardized tests and measures addressing body structure, function, activity limitation and / or participation in recreation  ;Presentation LOW Complexity : Stable, uncomplicated  ;Clinical Decision Making LOW Complexity : FOTO score of   Overall Complexity Rating: LOW     Problem List: pain affecting function, decrease strength, decrease ADL/ functional abilitiies, decrease activity tolerance, decrease transfer abilities and other impaired fine motor coordination and impaired R hand sensation   Treatment Plan may include any combination of the following: Therapeutic exercise, Therapeutic activities, Neuromuscular re-education, Physical agent/modality, Manual therapy and Patient education  Patient / Family readiness to learn indicated by: asking questions, trying to perform skills and interest  Persons(s) to be included in education: patient (P)  Barriers to Learning/Limitations: None  Patient Goal (s): regain use of R hand  Patient Self Reported Health Status: fair  Rehabilitation Potential: good    Short Term Goals: To be accomplished in 8 treatments:  1. Pt reports compliance w/HEP  2. Pt demonstrates 45lbs of R  strength. 3. Pt has improved sensation in the R thumb based on monofilament testing to allow him to  small objects 4 of 5 times. 4. Pt is able to perform 9 hold peg test using R hand in less than 1 min 30 seconds. 5. Pt is able to perform slow 10 count shoulder movements without jerking motion 3 of 4 attempts. Long Term Goals: To be accomplished in 16 treatments:  1. Pt has 60lbs of R hand  needed to use tools in his shop. 2. Pt has fine motor pinch strength increased by 2lbs on R hand. 3. Pt is able to use knife/fork to cut food w/o need for built up handles. 4. Pt is able to perform screw  tasks using the R hand safely for 1 continuous minute. 5. Pt is able to use his right hand to perform 9 hold peg test in under one minute. Frequency / Duration: Patient to be seen 1-2 times per week as therapist available for 16 treatments.     Patient/ Caregiver education and instruction: activity modification and exercises    [x]  Plan of care has been reviewed with PTA    The severity rating is based on clinical judgment and the Other monofilament sensation, strength, coordination testing score. Certification WFXWAO:2/75/69 - 8/23/22  Varun Rothman OT 5/25/2022   _____________________________________________________________________    I certify that the above Therapy Services are being furnished while the patient is under my care. I agree with the treatment plan and certify that this therapy is necessary. Physician's Signature:____________________  Date:____________Time: _________           Laz Edward MD    Please sign and return to: [facility]  [address], [place], [address] [facility]:  [address] [place] [address] Street: [phone]

## 2022-05-25 NOTE — PROGRESS NOTES
1. Have you been to the ER, urgent care clinic since your last visit? Hospitalized since your last visit? yes    2. Have you seen or consulted any other health care providers outside of the [address] [place] [address] since your last visit? Include any pap smears or colon screening.  yes

## 2022-05-27 ENCOUNTER — APPOINTMENT (OUTPATIENT)
Dept: PHYSICAL THERAPY | Age: 77
End: 2022-05-27
Payer: MEDICARE

## 2022-06-03 ENCOUNTER — APPOINTMENT (OUTPATIENT)
Dept: PHYSICAL THERAPY | Age: 77
End: 2022-06-03
Payer: MEDICARE

## 2022-06-06 ENCOUNTER — APPOINTMENT (OUTPATIENT)
Dept: PHYSICAL THERAPY | Age: 77
End: 2022-06-06
Payer: MEDICARE

## 2022-06-07 ENCOUNTER — HOSPITAL ENCOUNTER (OUTPATIENT)
Dept: PHYSICAL THERAPY | Age: 77
Discharge: HOME | End: 2022-06-07
Payer: MEDICARE

## 2022-06-07 PROCEDURE — 97110 THERAPEUTIC EXERCISES: CPT

## 2022-06-07 PROCEDURE — 97161 PT EVAL LOW COMPLEX 20 MIN: CPT

## 2022-06-07 PROCEDURE — 97530 THERAPEUTIC ACTIVITIES: CPT

## 2022-06-07 NOTE — PROGRESS NOTES
OT DAILY TREATMENT NOTE - [facility]     Patient Name: Monica Jain  Date:2022  : 1945  [x]  Patient  Verified  Payor: Bettina Hart / Plan: [employer] / Product Type: Managed Care Medicare /    In time:1315 Out time:1400  Total Treatment Time (min): 45  Total Timed Codes (min): 45  1:1 Treatment Time ( [address] only): 45   Visit #: 2 of 16    Treatment Area: Muscle weakness (generalized) [M62.81]    SUBJECTIVE  Pain Level (0-10 scale): none reported  Any medication changes, allergies to medications, adverse drug reactions, diagnosis change, or new procedure performed?: [x] No    [] Yes (see summary sheet for update)  Subjective functional status/changes:   [] No changes reported  Pt injured R foot last week walking in the yard Has 4/10 pain. Has used ice packs. Examined and has bruising to top of foot near Blunt tarsal head and at distal joint II, III, IV and on small toe medial side. Can bend toes w/o increasing pain. Has been using R hand for manipulatives, self feeding but not using putty much    OBJECTIVE      30 min Therapeutic Exercise:  [] See flow sheet :  Trigger point work on the R upper trap followed by neck stretches with over pressure 30 sec each position; scapular retraction manual with place and hold; PROM stretch for median/ulnar/radial nerves R; taught forward lean place and hold; educated on shoulder strengthening program for seated red band HEP shoulder flex x 10; shoulder ER x 10 and horizontal abd x 10. Ed on shoulder strength versus coordination.    Rationale: increase ROM, increase strength and improve coordination to improve the patients ability to use R UE    15 min Therapeutic Activity:  []  See flow sheet : fine motor coordination activities including flipping over small cards; jenga game; demonstration for how to use dominos for manipulation; ed on in-hand manipulation and ideas for home   Rationale: increase strength and improve coordination  to improve the patients ability to use R hand                With   [x] TE   [] TA   [] neuro   [] other: Patient Education: [x] Review HEP  Increased with red band seated exercises up to 3 sets of 10 for shoulder horizontal abduction, shoulder ER and shoulder flexion  [] Progressed/Changed HEP based on:   [] positioning   [] body mechanics   [] transfers   [] heat/ice application    [] other:      Other Objective/Functional Measures: Requested retest of 9 hole peg for right. 2 min 26 sec (initial 2 min 5 sec)    Pain Level (0-10 scale) post treatment: none reported    ASSESSMENT/Changes in Function: pt has functional AROM in the R shoulder but has weakness and less coordination. Still has coordination and in-hand manipulation difficulty for fine motor on the R but is starting to use R hand for daily tasks more. Still needs someone to ambulate with him for safety. Patient will continue to benefit from skilled OT services to modify and progress therapeutic interventions, address ROM deficits, address strength deficits, analyze and address soft tissue restrictions and analyze and cue movement patterns to attain remaining goals. [x]  See Plan of Care  []  See progress note/recertification  []  See Discharge Summary         Progress towards goals / Updated goals: continue  Short Term Goals: To be accomplished in 8 treatments:  1. Pt reports compliance w/HEP  2. Pt demonstrates 45lbs of R  strength. 3. Pt has improved sensation in the R thumb based on monofilament testing to allow him to  small objects 4 of 5 times. 4. Pt is able to perform 9 hold peg test using R hand in less than 1 min 30 seconds. 5. Pt is able to perform slow 10 count shoulder movements without jerking motion 3 of 4 attempts.     Long Term Goals: To be accomplished in 16 treatments:  1. Pt has 60lbs of R hand  needed to use tools in his shop. 2. Pt has fine motor pinch strength increased by 2lbs on R hand.   3. Pt is able to use knife/fork to cut food w/o need for built up handles. 4. Pt is able to perform screw  tasks using the R hand safely for 1 continuous minute.   5. Pt is able to use his right hand to perform 9 hold peg test in under one minute.       PLAN  [x]  Upgrade activities as tolerated     [x]  Continue plan of care  []  Update interventions per flow sheet       []  Discharge due to:_  []  Other:_      Jasmeet Jones OT 6/7/2022

## 2022-06-07 NOTE — PROGRESS NOTES
[facility] Outpatient Rehabilitation  [address], [place], [address] [facility]:  [phone]  FAX: [phone]    Plan of Care/Statement of Necessity for Physical Therapy Services  2-15    Patient name: Soledad Corona  : 1945  Provider#: [id number]  Referral source: Laz Edward MD      Medical/Treatment Diagnosis: Muscle weakness (generalized) [M62.81]     Prior Hospitalization: see medical history     Comorbidities: see chart  Prior Level of Function: independent and active prior to CVA 2022  Medications: Verified on Patient Summary List  Start of Care: 2022      Onset Date: 2022   The Plan of Care and following information is based on the information from the initial evaluation. Assessment/ sanchez information: Mr. Hallie Mulligan is a very pleasant 77year old man presenting to [place] [address] PT clinic with R sided weakness after CVA in 2022. Pt participated in 4 weeks IPR at [facility] in [place], [place] prior to DC home. Pt reports fall last week while walking in yard, injuring R ankle and delaying onset of OP POC. Pt ambulated independently into clinic with RW and close supervision from wife. Noted poor safety awareness with gait and poor RW management/ impulsivity. Pt gait speed currently <1.0m/s indicating high risk for falls. He is far below his previous independent baseline and reports continued R sided/hand weakness since CVA. Pt likely will benefit from a bout of OP PT services 2-3x/wk for 24 visits to address mobility deficits and improve safety with community mobility.     Evaluation Complexity History HIGH Complexity :3+ comorbidities / personal factors will impact the outcome/ POC ; Examination LOW Complexity : 1-2 Standardized tests and measures addressing body structure, function, activity limitation and / or participation in recreation  ;Presentation LOW Complexity : Stable, uncomplicated  ;Clinical Decision Making LOW Complexity : FOTO score of   Overall Complexity Rating: LOW     Problem List: decrease ROM, decrease strength, edema affecting function, impaired gait/ balance, decrease activity tolerance and decrease transfer abilities   Treatment Plan may include any combination of the following: Therapeutic exercise, Therapeutic activities, Neuromuscular re-education, Gait/balance training, Manual therapy, Patient education, Self Care training, Functional mobility training, Home safety training and Stair training  Patient / Family readiness to learn indicated by: asking questions and trying to perform skills  Persons(s) to be included in education: patient (P)  Barriers to Learning/Limitations: None  Patient Goal (s): I want to get rid of this thing (taps walker)  Patient Self Reported Health Status: good  Rehabilitation Potential: good    Short Term Goals: To be accomplished in 8 visits:  1. Pt will demonstrate gait speed >0.5m/s with AD for improved gait speed and decreased fall risk. 2. Pt will perform HEP with Min A from family consistently for improved independence in therex/care plan. 3. Pt will report no falls this plan of care for improved safety and decreased impulsivity with mobility. Long Term Goals: To be accomplished in 24 visits:  1. Pt will demonstrate gait speed >1.0m/s with AD for improved gait speed and decreased fall risk. 2. Pt will perform HEP independently for improved transition to community level exercise program.  3. Pt will tolerate Gomez Balance test without AD for community level fall risk prediction. Frequency / Duration: Patient to be seen 2-3 times per week for 24 visits     Patient/ Caregiver education and instruction: self care, activity modification, brace/ splint application and exercises    [x]  Plan of care has been reviewed with PTA    The severity rating is based on clinical judgment. FOTO score to be completed next visit due to time constraints.     Certification Period: 6/7/2022 - 9/7/2022  Cecilia Ruvalcaba, PT 6/7/2022   ________________________________________________________________________    I certify that the above Therapy Services are being furnished while the patient is under my care. I agree with the treatment plan and certify that this therapy is necessary. Physician's Signature:____________________  Date:____________Time: _________           Jose Angel Mccallum MD    Please sign and return to: [facility]  [address], [place], [address] [facility]:  [address] [place] [address] Street: [phone]

## 2022-06-07 NOTE — PROGRESS NOTES
PT INITIAL EVALUATION NOTE - [facility] 2-15    Patient Name: Thalia Friedman  Date:2022  : 1945  [x]  Patient  Verified  Payor: Ren Ann / Plan: Crystal Theodore / Product Type: Managed Care Medicare /    In time:1410  Out time:1445  Total Treatment Time (min):35  Total Timed Codes (min): 35  1:1 Treatment Time ([facility] only): 35  Visit #: 1    *Pt late to session due to restroom after OT session - 35 min only due to full schedule today*    Treatment Area: Muscle weakness (generalized) [M62.81]    SUBJECTIVE  Pain Level (0-10 scale): 0  Any medication changes, allergies to medications, adverse drug reactions, diagnosis change, or new procedure performed?: [] No    [x] Yes (see summary sheet for update)  Subjective:    \"I did fall last week, that's why I missed seeing you\"    PLOF: Independent and active prior to CVA in 2022  Mechanism of Injury: 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 PMHx/Surgical Hx: see chart  Work Hx: retired  Living Situation: lives with wife. Unsure of home setup, both are retired. Pt Goals: \"to get rid of this thing\" (taps walker)  Barriers: impulsive  Motivation: good  Substance use: denies      OBJECTIVE/EXAMINATION  Posture: Forward head and rounded shoulders. Pt leans to R with mobility  Other Observations:  Pt impulsive with poor RW management. Per OT, pt running into door at start of session. Poor foot placement consistency  Gait and Functional Mobility:  Pt walked into clinic without assistance, wife providing close supervision and reaching for pt belt loops at times. Pt walked with RW, poor foot clearance, and poor RW management.   MMT: within functional limits except R hamstring 3+/5, R dorsiflexion 3/5  Neurological: Reflexes / Sensations: light touch intact in B LE    10 min Therapeutic Exercise:  [x] See flow sheet :   Rationale: increase ROM, increase strength, improve coordination and improve balance to improve the patients ability to perform gait and mobility with decreased risk for falls and improved independence.     25 min Initial Evaluation       With   [x] TE   [] TA   [] neuro   [] other: Patient Education: [x] Review HEP    [x] Progressed/Changed HEP based on:   [] positioning   [] body mechanics   [] transfers   [] heat/ice application    [] other:      Other Objective/Functional Measures: gait speed 0.36m/s with RW and CGA    Pain Level (0-10 scale) post treatment: 0    ASSESSMENT/Changes in Function:     [x]  See Plan of Irma Reilly 386, PT, DPT, NCS 6/7/2022

## 2022-06-08 ENCOUNTER — APPOINTMENT (OUTPATIENT)
Dept: PHYSICAL THERAPY | Age: 77
End: 2022-06-08
Payer: MEDICARE

## 2022-06-09 ENCOUNTER — HOSPITAL ENCOUNTER (OUTPATIENT)
Dept: PHYSICAL THERAPY | Age: 77
Discharge: HOME | End: 2022-06-09
Payer: MEDICARE

## 2022-06-09 PROCEDURE — 97110 THERAPEUTIC EXERCISES: CPT

## 2022-06-09 PROCEDURE — 97530 THERAPEUTIC ACTIVITIES: CPT

## 2022-06-09 PROCEDURE — 97016 VASOPNEUMATIC DEVICE THERAPY: CPT

## 2022-06-09 PROCEDURE — 97112 NEUROMUSCULAR REEDUCATION: CPT

## 2022-06-09 NOTE — PROGRESS NOTES
PT DAILY TREATMENT NOTE - [facility]     Patient Name: Joel Carbajal  Date:2022  : 1945  [x]  Patient  Verified  Payor: Citlali Bee / Plan: Gil Velez / Product Type: Managed Care Medicare /    In time:1315  Out time:1400  Total Treatment Time (min): 45  Total Timed Codes (min): 45  1:1 Treatment Time ( [address] only): 45   Visit #: 2 of 16    Treatment Area: Muscle weakness (generalized) [M62.81]    SUBJECTIVE  Pain Level (0-10 scale): 5/10 headache with radiating symptoms to face/ R neck. /89  Any medication changes, allergies to medications, adverse drug reactions, diagnosis change, or new procedure performed?: [x] No    [] Yes (see summary sheet for update)  Subjective functional status/changes:   [x] No changes reported  \"Im just not having the best day\"    OBJECTIVE    Modality rationale: decrease edema, decrease inflammation, decrease pain, increase tissue extensibility and increase muscle contraction/control to improve the patients ability to walk safely community level distances with decreased pain. Min Type Additional Details   15 [x]  Vasopneumatic Device Pressure:       [x] lo [] med [] hi   Temperature: [x] lo [] med [] hi   [x] Skin assessment post-treatment:  [x]intact [x]redness- no adverse reaction    []redness - adverse reaction:     15 min Therapeutic Exercise:  [x] See flow sheet :   Rationale: increase ROM, increase strength and improve coordination to improve the patients ability to perform community mobility safely and independently. 15 min Neuromuscular Re-education:  [x]  See flow sheet :   Rationale: increase strength, improve coordination, improve balance and increase proprioception  to improve the patients ability to perform community level balance and mobility tasks independently and with decreased fall risk.      unbilled min Gait Training:  _150x2 __ feet with RW___ device on level surfaces with _CGA_Min A__ level of assist - needed gait belt for safety             With   [x] TE   [] TA   [] neuro   [] other: Patient Education: [x] Review HEP    [x] Progressed/Changed HEP based on:   [] positioning   [] body mechanics   [] transfers   [] heat/ice application    [] other:        Pain Level (0-10 scale) post treatment: 5/10 - R side of head/face. No ankle pain reported    ASSESSMENT/Changes in Function: Mr. Erasmo Salazra continues to make steady progress with mobility this plan of care. He ambulated into clinic with RW and CGA-SBA from with using gait belt. Noted inconsistent foot placement with R foot and veering to R, corrected with cues. Initiated balance training with good tolerance performing single limb stance cone taps with R foot to improve L weight shift/ orientation to midline. Pt with complaints of transient dizziness and double vision with balance training. BP normal and pt stating \"this happens when I start exercise, and it goes away\". Encouraged pt and wife to mention this at his neurology follow up 6/13. Continue per POC. Patient will continue to benefit from skilled PT services to modify and progress therapeutic interventions, address functional mobility deficits, address ROM deficits, address strength deficits, analyze and address soft tissue restrictions, analyze and cue movement patterns, analyze and modify body mechanics/ergonomics, assess and modify postural abnormalities, address imbalance/dizziness and instruct in home and community integration to attain remaining goals.      [x]  See Plan of Care  [x]  See progress note/recertification  []  See Discharge Summary    PLAN  [x]  Upgrade activities as tolerated     [x]  Continue plan of care  [x]  Update interventions per flow sheet       []  Discharge due to:_  []  Other:_      Denton Gunter, PT, DPT, NCS 6/9/2022

## 2022-06-09 NOTE — PROGRESS NOTES
OT DAILY TREATMENT NOTE - [facility]     Patient Name: Sania Blakely  Date:2022  : 1945  [x]  Patient  Verified  Payor: Jj Ortiz / Plan: Federico Vidales / Product Type: Managed Care Medicare /    In time: 2615  Out time:1443  Total Treatment Time (min): 40  Total Timed Codes (min): 40  1:1 Treatment Time ( only): 40   Visit #:3 of 16    Treatment Area: Muscle weakness (generalized) [M62.81]    SUBJECTIVE  Pain Level (0-10 scale): PT treated his ankle pain so currently no pain  Any medication changes, allergies to medications, adverse drug reactions, diagnosis change, or new procedure performed?: [x] No    [] Yes (see summary sheet for update)  Subjective functional status/changes:   [] No changes reported  Pt reports he's been doing his exercises. OBJCTIVE    8 min Therapeutic Exercise:  [] See flow sheet : Ed on sensory bucket with rice, ed on avoiding shoulder shrub while ambulating with walker. Ed and recommendation for using vecro loop pieces and where to purchase to provide tactile input to fingers R hand. Ed on impact of numbness/tingling during task performance   Rationale: education to increase understanding of CVA impact and areas to work on  to improve the patients ability to improve pt ability to understand why he's having the difficulties he has following CVA    32 min Therapeutic Activity:  []  See flow sheet : Pt performed a variety of tasks including large/medium nut, washer and bolt but could not perform smaller size; performed opening a combination lock; performed sensory activity; performed sensory training; used R hand to , flip over and place objects with difficulty but improved when velcro loop added to surface; performed finger strengthening with resisted pegs R hand. Max using R hand and velcro on marker x 2 and located 3 words on word jumble using R hand on marker to berenice though each letter while keeping hand on table.    Rationale: increase strength, improve coordination and increase proprioception  to improve the patients ability to use R hand                 With   [x] TE   [] TA   [] neuro   [] other: Patient Education: [x] Review HEP    [] Progressed/Changed HEP based on:   [] positioning   [] body mechanics   [] transfers   [] heat/ice application    [] other:      Other Objective/Functional Measures:     R 45lbs (was 34lbs)    Pain Level (0-10 scale) post treatment: none    ASSESSMENT/Changes in Function: Pt not feeling well by report. He has imrpved  strength on the R. He is performing HEP. He better understands limitations and impact due to hand numbness when trying to  objects. Working on in=-hand manipulation. Patient will continue to benefit from skilled OT services to modify and progress therapeutic interventions, address ROM deficits, address strength deficits, analyze and address soft tissue restrictions, analyze and cue movement patterns and analyze and modify body mechanics/ergonomics to attain remaining goals. [x]  See Plan of Care  []  See progress note/recertification  []  See Discharge Summary         Progress towards goals / Updated goals: continue  Short Term Goals: To be accomplished in 8 treatments:  1. Pt reports compliance w/HEP  2. Pt demonstrates 45lbs of R  strength. MET 6/9/33  3. Pt has improved sensation in the R thumb based on monofilament testing to allow him to  small objects 4 of 5 times. 4. Pt is able to perform 9 hold peg test using R hand in less than 1 min 30 seconds. 5. Pt is able to perform slow 10 count shoulder movements without jerking motion 3 of 4 attempts.     Long Term Goals: To be accomplished in 16 treatments:  1. Pt has 60lbs of R hand  needed to use tools in his shop. 2. Pt has fine motor pinch strength increased by 2lbs on R hand. 3. Pt is able to use knife/fork to cut food w/o need for built up handles.   4. Pt is able to perform screw  tasks using the R hand safely for 1 continuous minute.   5. Pt is able to use his right hand to perform 9 hold peg test in under one minute.       PLAN  []  Upgrade activities as tolerated     [x]  Continue plan of care  []  Update interventions per flow sheet       []  Discharge due to:_  []  Other:_      Mae Ambrose OT 6/9/2022

## 2022-06-13 ENCOUNTER — APPOINTMENT (OUTPATIENT)
Dept: PHYSICAL THERAPY | Age: 77
End: 2022-06-13
Payer: MEDICARE

## 2022-06-15 ENCOUNTER — APPOINTMENT (OUTPATIENT)
Dept: PHYSICAL THERAPY | Age: 77
End: 2022-06-15
Payer: MEDICARE

## 2022-06-15 ENCOUNTER — HOSPITAL ENCOUNTER (OUTPATIENT)
Dept: PHYSICAL THERAPY | Age: 77
End: 2022-06-15
Payer: MEDICARE

## 2022-06-16 ENCOUNTER — HOSPITAL ENCOUNTER (OUTPATIENT)
Dept: PHYSICAL THERAPY | Age: 77
Discharge: HOME | End: 2022-06-16
Payer: MEDICARE

## 2022-06-16 PROCEDURE — 97112 NEUROMUSCULAR REEDUCATION: CPT

## 2022-06-16 PROCEDURE — 97530 THERAPEUTIC ACTIVITIES: CPT

## 2022-06-16 PROCEDURE — 97110 THERAPEUTIC EXERCISES: CPT

## 2022-06-16 NOTE — PROGRESS NOTES
OT DAILY TREATMENT NOTE - [facility]     Patient Name: Joel Carbajal  Date:2022  : 1945  [x]  Patient  Verified  Payor: Citlali Bee / Plan: Gil Velez / Product Type: Managed Care Medicare /    In time:1528  Out time:1606  Total Treatment Time (min):37  Total Timed Codes (min): 37  1:1 Treatment Time ( [address] only): 37   Visit #: 4 of 16    Treatment Area: Muscle weakness (generalized) [M62.81]    SUBJECTIVE  Pain Level (0-10 scale): 0/10  Any medication changes, allergies to medications, adverse drug reactions, diagnosis change, or new procedure performed?: [x] No    [] Yes (see summary sheet for update)  Subjective functional status/changes:   [] No changes reported  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 OBJECTIVE        37 min Therapeutic Activity:  []  See flow sheet : fine motor activities including in-hand manipulation with various sized materials with best success placing 2 large marbles in hand palm down and moving back to fingertips to place. Grooved pegboard; resisted clothes pins, pencil manipulation; shoulder coordination activities, explored mouse activity games online. Discussed continued sensory loss and pt will put a rice bucket together for sensory practice at home. Discussed having son make him a scrap board with nails started to be hammered in as well as ratchet and cresent wrench use. Recommended safety with cutting for cooking activities using eyes to compensate due to continued sensation loss.    Rationale: increase strength, improve coordination and increase proprioception  to improve the patients ability to use RUE           With   [] TE   [x] TA   [] neuro   [] other: Patient Education: [x] Review HEP    [] Progressed/Changed HEP based on:   [] positioning   [] body mechanics   [] transfers   [] heat/ice application    [] other:      Pain Level (0-10 scale) post treatment: 0/10    ASSESSMENT/Changes in Function: Pt has continued loss of sensation in the R hand and loss of strength and coordination. Ideas for home activities given and discussed. He is dressing himself independently, able to use the R hand to self feed with fork and control spoonfuls of sugar into coffee now. Patient will continue to benefit from skilled PT services to modify and progress therapeutic interventions, monitor movement patterns, improve coordination, sensation and strength to attain remaining goals. [x]  See Plan of Care  []  See progress note/recertification  []  See Discharge Summary         Progress towards goals / Updated goals: continue  Short Term Goals: To be accomplished in 8 treatments:  1. Pt reports compliance w/HEP  2. Pt demonstrates 45lbs of R  strength. MET 6/9/33  3. Pt has improved sensation in the R thumb based on monofilament testing to allow him to  small objects 4 of 5 times. 4. Pt is able to perform 9 hold peg test using R hand in less than 1 min 30 seconds. 5. Pt is able to perform slow 10 count shoulder movements without jerking motion 3 of 4 attempts.     Long Term Goals: To be accomplished in 16 treatments:  1. Pt has 60lbs of R hand  needed to use tools in his shop. 2. Pt has fine motor pinch strength increased by 2lbs on R hand. 3. Pt is able to use knife/fork to cut food w/o need for built up handles. 4. Pt is able to perform screw  tasks using the R hand safely for 1 continuous minute. 5. Pt is able to use his right hand to perform 9 hold peg test in under one minute.     PLAN  []  Upgrade activities as tolerated     [x]  Continue plan of care  []  Update interventions per flow sheet       []  Discharge due to:_  []  Other:_      Ya Bird OT 6/16/2022

## 2022-06-16 NOTE — PROGRESS NOTES
PT DAILY TREATMENT NOTE - [facility]     Patient Name: Sania Blakely  Date:2022  : 1945  [x]  Patient  Verified  Payor: Jj Ortiz / Plan: Federico Vidales / Product Type: Managed Care Medicare /    In time:1445  Out time:1530  Total Treatment Time (min): 45  Total Timed Codes (min): 45  1:1 Treatment Time ( [address] only): 39   Visit #: 3 of 16    Treatment Area: Muscle weakness (generalized) [M62.81]    SUBJECTIVE  Pain Level (0-10 scale): 0  Any medication changes, allergies to medications, adverse drug reactions, diagnosis change, or new procedure performed?: [x] No    [] Yes (see summary sheet for update)  Subjective functional status/changes:   [x] No changes reported  \"I'm just tired and so dizzy today\" (/73)    OBJECTIVE    15 min Therapeutic Exercise:  [x] See flow sheet :   Rationale: increase ROM, increase strength, improve coordination and improve balance to improve the patients ability to perform community level gait and mobility safely, independently, and with decreased pain. 30 min Neuromuscular Re-education:  [x]  See flow sheet :   Rationale: increase ROM, increase strength, improve coordination, improve balance and increase proprioception  to improve the patients ability to perform community level gait and mobility safely, independently, and with decreased risk for falls. Normalizing balance and gait kinematics. With   [] TE   [] TA   [] neuro   [] other: Patient Education: [x] Review HEP    [] Progressed/Changed HEP based on:   [] positioning   [] body mechanics   [] transfers   [] heat/ice application    [] other:        Pain Level (0-10 scale) post treatment: 0    ASSESSMENT/Changes in Function: Mr. Magdalene Ybarra ambulated into clinic independently with RW and wife close by. He reports performing gait training and HEP at home as educated for improved mobility. Initiated training with single point cane using small hurrycane base today.  Pt performed multiple bouts with improved weight shifting to L with use of cane in L hand compared to veering to R with RW. Continued interventions of balance including cone taps and justina negotiation to improve single limb stance and balance with dynamic tasks. Recommend further cane training prior to use in community or independently due to high fall risk. He is making steady improvements towards balance and mobility goals this plan of care. Continue per POC. Patient will continue to benefit from skilled PT services to modify and progress therapeutic interventions, address functional mobility deficits, address ROM deficits, address strength deficits, analyze and address soft tissue restrictions, analyze and cue movement patterns, assess and modify postural abnormalities, address imbalance/dizziness and instruct in home and community integration to attain remaining goals. [x]  See Plan of Care  [x]  See progress note/recertification  []  See Discharge Summary         Progress towards goals / Updated goals:   Working towards goals    PLAN  [x]  Upgrade activities as tolerated     [x]  Continue plan of care  [x]  Update interventions per flow sheet       []  Discharge due to:_  []  Other:_      Magdalena Espinosa, PT, DPT, NCS 6/16/2022

## 2022-06-21 ENCOUNTER — HOSPITAL ENCOUNTER (OUTPATIENT)
Dept: PHYSICAL THERAPY | Age: 77
Discharge: HOME | End: 2022-06-21
Payer: MEDICARE

## 2022-06-21 PROCEDURE — 97116 GAIT TRAINING THERAPY: CPT

## 2022-06-21 PROCEDURE — 97140 MANUAL THERAPY 1/> REGIONS: CPT

## 2022-06-21 PROCEDURE — 97016 VASOPNEUMATIC DEVICE THERAPY: CPT

## 2022-06-21 PROCEDURE — 97110 THERAPEUTIC EXERCISES: CPT

## 2022-06-21 PROCEDURE — 97530 THERAPEUTIC ACTIVITIES: CPT

## 2022-06-21 NOTE — PROGRESS NOTES
OT DAILY TREATMENT NOTE - [facility]     Patient Name: Howard Snyder  Date:2022  : 1945  [x]  Patient  Verified  Payor: Nicky Ruano / Plan: Beverli Opitz / Product Type: Managed Care Medicare /    In time:1448  Out time:1528  Total Treatment Time (min): 40  Total Timed Codes (min): 40  1:1 Treatment Time ( [address] only): 40   Visit #: 5 of 16    Treatment Area: Muscle weakness (generalized) [M62.81]    SUBJECTIVE  Pain Level (0-10 scale): 0/10  Any medication changes, allergies to medications, adverse drug reactions, diagnosis change, or new procedure performed?: [x] No    [] Yes (see summary sheet for update)  Subjective functional status/changes:   [] No changes reported  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 Difficulty getting up from being down on knees to work. Tries to use rolling stool. OBJECTIVE        12 min Therapeutic Exercise:  [] See flow sheet : educated and worked on functional balance for reaching, weight shifting onto the stronger foot. More balance issues noted with the L when weight shifting than R. He practiced pointing toe toward the direction of reach during lateral reach as well. Practiced fine motor manipulation. Practiced slow controlled raising of the R hand/shoulder into flexion. Rationale: increase strength, improve coordination and increase proprioception to improve the patients ability to use R hand, RUE    28 min Therapeutic Activity:  []  See flow sheet : fine motor coordination task with initiating building a bird feeder. Practiced 2 handed catch of a small bumpy ball with overhand R toss back, practiced tossing into a large hoop with accuracy 3 of 4 throws. Practiced overhand throw to a target on the wall and catching when it bounced back with approx 25% accuracy. Stood with wide base of support and bounced and caught a larger ball in the forward plane then rotating to bounce and catch to each side but within the base of support. Noted to only lose balance with rotating and bouncing to the Left twice, sometimes exaggerated forward lean, but not the right. Rationale: increase ROM, increase strength, improve coordination and increase proprioception  to improve the patients ability to use RUE                  With   [x] TE   [] TA   [] neuro   [] other: Patient Education: [x] Review HEP    [] Progressed/Changed HEP based on:   [] positioning   [] body mechanics   [] transfers   [] heat/ice application    [] other:          Pain Level (0-10 scale) post treatment: 0/10    ASSESSMENT/Changes in Function: Pt continues to struggle with in hand and fine motor manipulation. He is improved though with smooth shoulder movements. He is able to 2 handed catch and then toss a 4-5 inch bumpy ball when tossed to him and struggled more to catch if tossed overhand against the wall. He worked on functional reach balance as is back working in his shop. Ed to wife/pt about using his eyes to compensate for loss of sensation in the R hand to avoid injury. His balance seems worse for weight shifting toward the L side compared to the R today. Patient will continue to benefit from skilled OT services to modify and progress therapeutic interventions, address ROM deficits, address strength deficits, analyze and address soft tissue restrictions, analyze and cue movement patterns and analyze and modify body mechanics/ergonomics to attain remaining goals. [x]  See Plan of Care  []  See progress note/recertification  []  See Discharge Summary               Progress towards goals / Updated goals: continue  Short Term Goals: To be accomplished in 8 treatments:  1. Pt reports compliance w/HEP  2. Pt demonstrates 45lbs of R  strength.  MET 6/9/33  3. Pt has improved sensation in the R thumb based on monofilament testing to allow him to  small objects 4 of 5 times. 4. Pt is able to perform 9 hold peg test using R hand in less than 1 min 30 seconds.   5. Pt is able to perform slow 10 count shoulder movements without jerking motion 3 of 4 attempts.     Long Term Goals: To be accomplished in 16 treatments:  1. Pt has 60lbs of R hand  needed to use tools in his shop. 2. Pt has fine motor pinch strength increased by 2lbs on R hand. 3. Pt is able to use knife/fork to cut food w/o need for built up handles. 4. Pt is able to perform screw  tasks using the R hand safely for 1 continuous minute. 5. Pt is able to use his right hand to perform 9 hold peg test in under one minute.     PLAN  []  Upgrade activities as tolerated     [x]  Continue plan of care  []  Update interventions per flow sheet       []  Discharge due to:_  []  Other:_      Risa Brunner OT 6/21/2022

## 2022-06-21 NOTE — PROGRESS NOTES
PT DAILY TREATMENT NOTE - [facility]     Patient Name: Franklin Sofia  Date:2022  : 1945  [x]  Patient  Verified  Payor: Roro Schmidt / Plan: Shawanda Tipton / Product Type: Managed Care Medicare /    In time:  Out time:142  Total Treatment Time (min): 55  Total Timed Codes (min): 55  1:1 Treatment Time ( [address] only): 54   Visit #: 4 of 16    Treatment Area: Muscle weakness (generalized) [M62.81]    SUBJECTIVE  Pain Level (0-10 scale): 2 R ankle   Any medication changes, allergies to medications, adverse drug reactions, diagnosis change, or new procedure performed?: [x] No    [] Yes (see summary sheet for update)  Subjective functional status/changes:   [] No changes reported  I haven't fallen in a while (~3 weeks), but     OBJECTIVE    Modality rationale: decrease inflammation and decrease pain to improve the patients ability to WB with less discomfort.    Min Type Additional Details    [] Estim:  []Unatt       []IFC  []Premod                        []Other:  []w/ice   []w/heat  Position:  Location:    [] Estim: []Att    []TENS instruct  []NMES                    []Other:  []w/US   []w/ice   []w/heat  Position:  Location:    []  Traction: [] Cervical       []Lumbar                       [] Prone          []Supine                       []Intermittent   []Continuous Lbs:  [] before manual  [] after manual    []  Ultrasound: []Continuous   [] Pulsed                           []1MHz   []3MHz W/cm2:  Location:    []  Iontophoresis with dexamethasone         Location: [] Take home patch   [] In clinic    []  Ice     []  heat  []  Ice massage  []  Laser   []  Anodyne Position:  Location:    []  Laser with stim  []  Other:  Position:  Location:   15 [x]  Vasopneumatic Device Pressure:       [] lo [x] med [] hi   Temperature: [] lo [x] med [] hi   [] Skin assessment post-treatment:  []intact []redness- no adverse reaction    []redness - adverse reaction:     30 min Therapeutic Exercise:  [] See flow sheet :   Rationale: increase ROM, increase strength, improve coordination and improve balance to improve the patients ability to amb with decrease of ataxia. 10 min Gait Trainin feet with SPC device on level surfaces with SBA/CGA level of assist   Rationale: With   [x] TE   [] TA   [] neuro   [] other: Patient Education: [x] Review HEP    [x] Progressed/Changed HEP based on:   [] positioning   [] body mechanics   [] transfers   [] heat/ice application    [] other:        Pain Level (0-10 scale) post treatment: 0    ASSESSMENT/Changes in Function: Pt entered gym with OT. Continued difficulty with R grasp onto RW and frequent ataxia. Exercises increased with frequent rest breaks. Ex well tolerated. SPC trial was fair but ataxic, with pt stepping across midline 4x, but recovered without fall. Pt provided with Vaso pneumatic ice for R ankle swelling. Patient will continue to benefit from skilled PT services to modify and progress therapeutic interventions and address strength deficits to attain remaining goals. [x]  See Plan of Care  []  See progress note/recertification  []  See Discharge Summary         Progress towards goals / Updated goals:  Short Term Goals: To be accomplished in 8 visits:  1. Pt will demonstrate gait speed >0.5m/s with AD for improved gait speed and decreased fall risk. Progressing   2. Pt will perform HEP with Min A from family consistently for improved independence in therex/care plan. Progressing   3. Pt will report no falls this plan of care for improved safety and decreased impulsivity with mobility.     Long Term Goals: To be accomplished in 24 visits:  1. Pt will demonstrate gait speed >1.0m/s with AD for improved gait speed and decreased fall risk.   2. Pt will perform HEP independently for improved transition to community level exercise program.  3. Pt will tolerate Gomez Balance test without AD for community level fall risk prediction.       PLAN  [x] Upgrade activities as tolerated     []  Continue plan of care  []  Update interventions per flow sheet       []  Discharge due to:_  []  Other:_      Nayan Hayes, PTA 6/21/2022

## 2022-06-22 ENCOUNTER — APPOINTMENT (OUTPATIENT)
Dept: PHYSICAL THERAPY | Age: 77
End: 2022-06-22
Payer: MEDICARE

## 2022-06-23 ENCOUNTER — HOSPITAL ENCOUNTER (OUTPATIENT)
Dept: PHYSICAL THERAPY | Age: 77
Discharge: HOME | End: 2022-06-23
Payer: MEDICARE

## 2022-06-23 PROCEDURE — 97110 THERAPEUTIC EXERCISES: CPT

## 2022-06-23 PROCEDURE — 97016 VASOPNEUMATIC DEVICE THERAPY: CPT

## 2022-06-23 NOTE — PROGRESS NOTES
PT DAILY TREATMENT NOTE - [facility]     Patient Name: Jitendra Monk  Date:2022  : 1945  [x]  Patient  Verified  Payor: Ismael Guillen / Plan: Giovanni Goodie Goodie [employer]oren / Product Type: Managed Care Medicare /    In time:0  Out time:163  Total Treatment Time (min): 64  Total Timed Codes (min): 64  1:1 Treatment Time ( only): 64   Visit #: 5 of 16    Treatment Area: Muscle weakness (generalized) [M62.81]    SUBJECTIVE  Pain Level (0-10 scale): 0  Any medication changes, allergies to medications, adverse drug reactions, diagnosis change, or new procedure performed?: [x] No    [] Yes (see summary sheet for update)  Subjective functional status/changes:   [] No changes reported  I have my walker in the car    OBJECTIVE    Modality rationale: decrease inflammation and decrease pain to improve the patients ability to fit sneaker more appropriately.    Min Type Additional Details    [] Estim:  []Unatt       []IFC  []Premod                        []Other:  []w/ice   []w/heat  Position:  Location:    [] Estim: []Att    []TENS instruct  []NMES                    []Other:  []w/US   []w/ice   []w/heat  Position:  Location:    []  Traction: [] Cervical       []Lumbar                       [] Prone          []Supine                       []Intermittent   []Continuous Lbs:  [] before manual  [] after manual    []  Ultrasound: []Continuous   [] Pulsed                           []1MHz   []3MHz W/cm2:  Location:    []  Iontophoresis with dexamethasone         Location: [] Take home patch   [] In clinic    []  Ice     []  heat  []  Ice massage  []  Laser   []  Anodyne Position:  Location:    []  Laser with stim  []  Other:  Position:  Location:   15 [x]  Vasopneumatic Device Pressure:       [] lo [x] med [] hi   Temperature: [] lo [x] med [] hi   [] Skin assessment post-treatment:  []intact []redness- no adverse reaction    []redness - adverse reaction:     49 min Therapeutic Exercise:  [] See flow sheet : Rationale: improve coordination, improve balance and increase proprioception to improve the patients ability to decrease AD dependence. With   [] TE   [] TA   [] neuro   [] other: Patient Education: [x] Review HEP    [] Progressed/Changed HEP based on:   [] positioning   [] body mechanics   [] transfers   [] heat/ice application    [] other:         Pain Level (0-10 scale) post treatment: Continued R ankle swelling. repon    ASSESSMENT/Changes in Function: Pt continues to make progress with gait, although he is attempting to advance his AD too quickly. Pt will continue to benefit from treatment for coordination and safety enhancements. Patient will continue to benefit from skilled PT services to analyze and cue movement patterns, analyze and modify body mechanics/ergonomics and assess and modify postural abnormalities to attain remaining goals.      [x]  See Plan of Care  []  See progress note/recertification  []  See Discharge Summary          PLAN  [x]  Upgrade activities as tolerated     []  Continue plan of care  []  Update interventions per flow sheet       []  Discharge due to:_  []  Other:_      Aure Joe, PTA 6/23/2022

## 2022-06-27 ENCOUNTER — HOSPITAL ENCOUNTER (OUTPATIENT)
Dept: PHYSICAL THERAPY | Age: 77
Discharge: HOME | End: 2022-06-27
Payer: MEDICARE

## 2022-06-27 PROCEDURE — 97530 THERAPEUTIC ACTIVITIES: CPT

## 2022-06-27 PROCEDURE — 97140 MANUAL THERAPY 1/> REGIONS: CPT

## 2022-06-27 PROCEDURE — 97112 NEUROMUSCULAR REEDUCATION: CPT

## 2022-06-27 PROCEDURE — 97110 THERAPEUTIC EXERCISES: CPT

## 2022-06-27 NOTE — PROGRESS NOTES
OT DAILY TREATMENT NOTE - [facility]     Patient Name: Rudolph Macias  Date:2022  : 1945  [x]  Patient  Verified  Payor: Katharine Che / Plan: Ladivelma Canavan / Product Type: Managed Care Medicare /    In time:1400 Out time: 9341  Total Treatment Time (min): 45  Total Timed Codes (min): 45  1:1 Treatment Time ( [address] only): 45  Visit #: 7 of 16    Treatment Area: Muscle weakness (generalized) [M62.81]    SUBJECTIVE  Pain Level (0-10 scale): no pain  Any medication changes, allergies to medications, adverse drug reactions, diagnosis change, or new procedure performed?: [x] No    [] Yes (see summary sheet for update)  Subjective functional status/changes:   [] No changes reported  Pt reports he is using the R hand to shave a little and brush his teeth. Has been performing rice bucket with difficulty differentiating between quarter and dime. OBJECTIVE      37 min Therapeutic Activity:  []  See flow sheet : Pt engaged in activities to improve fine motor control via painting, sensory bucket. He is able to turn his hand holding the paint brush to get coverage. Pt digits IV and V taped into flexion to promote using I, II, III in rice bucket with greater success. Rationale: improve coordination  to improve the patients ability to use R hand      8 min Manual Therapy:  Manual stretch AAROM with over pressure for cervical rotation bilateral; forward flexion with rotation bilateral; SC joint mob bilateral in anterior/posterior glide on exhale 1 rep x 3 L and 2 reps x 3 right.  Manual bilateral pec stretch   Rationale: decrease pain, increase ROM and increase tissue extensibility to benefit R hand due to sensation changes               With   [] TE   [x] TA   [] neuro   [] other: Patient Education: [x] Review HEP    [] Progressed/Changed HEP based on:   [] positioning   [] body mechanics   [] transfers   [] heat/ice application    [] other: continue with activities, sensory bucket     Other Objective/Functional Measures:    Tip pinch R 13lbs (was 10lbs)   50lbs (was 54lbs)    Pain Level (0-10 scale) post treatment: none    ASSESSMENT/Changes in Function: Pt showing improvement in fine motor control, turning hand instead of object being painted. He has SC joint tightness and decreased L neck rotation compared to R. He continues to struggle with sensory bucket but showed some improvement when V and IV taped into flexion as he was dropping through those fingers. Patient will continue to benefit from skilled OT services to modify and progress therapeutic interventions, address ROM deficits, address strength deficits, analyze and address soft tissue restrictions, analyze and cue movement patterns and analyze and modify body mechanics/ergonomics to attain remaining goals. [x]  See Plan of Care  []  See progress note/recertification  []  See Discharge Summary         Progress towards goals / Updated goals: continue  Short Term Goals: To be accomplished in 8 treatments:  1. Pt reports compliance w/HEP  2. Pt demonstrates 45lbs of R  strength.  MET 6/9/33  3. Pt has improved sensation in the R thumb based on monofilament testing to allow him to  small objects 4 of 5 times. 4. Pt is able to perform 9 hold peg test using R hand in less than 1 min 30 seconds. 5. Pt is able to perform slow 10 count shoulder movements without jerking motion 3 of 4 attempts. MET      Long Term Goals: To be accomplished in 16 treatments:  1. Pt has 60lbs of R hand  needed to use tools in his shop. 2. Pt has fine motor pinch strength increased by 2lbs on R hand. 3. Pt is able to use knife/fork to cut food w/o need for built up handles. 4. Pt is able to perform screw  tasks using the R hand safely for 1 continuous minute. MET and also using drill and impact .   5. Pt is able to use his right hand to perform 9 hold peg test in under one minute.       PLAN  [x]  Upgrade activities as tolerated [x]  Continue plan of care  []  Update interventions per flow sheet       []  Discharge due to:_  []  Other:_      Fabiola Ray OT 6/27/2022

## 2022-06-27 NOTE — PROGRESS NOTES
PT DAILY TREATMENT NOTE - [facility]     Patient Name: Vahid Huff  Date:2022  : 1945  [x]  Patient  Verified  Payor: Mellissa Styles / Plan: Daniella Griffith / Product Type: Managed Care Medicare /    In time:1315  Out time:1400  Total Treatment Time (min): 45  Total Timed Codes (min): 45  1:1 Treatment Time ( [address] only): 45   Visit #: 6 of 16    Treatment Area: Muscle weakness (generalized) [M62.81]    SUBJECTIVE  Pain Level (0-10 scale): denies pain  Any medication changes, allergies to medications, adverse drug reactions, diagnosis change, or new procedure performed?: [x] No    [] Yes (see summary sheet for update)  Subjective functional status/changes:   [] No changes reported  \"I'm using this cane here see\" (holds up cane)    OBJECTIVE      15 min Therapeutic Exercise:  [x] See flow sheet :   Rationale: increase ROM, increase strength, improve coordination and improve balance to improve the patients ability to perform community level mobility safely and independently with decreased risk for falls. 30 min Neuromuscular Re-education:  [x]  See flow sheet :   Rationale: increase ROM, increase strength and improve coordination  to improve the patients ability to perform community level balance and gait tasks safely with normalized mechanics and decreased fall risk. With   [x] TE   [] TA   [] neuro   [] other: Patient Education: [x] Review HEP    [x] Progressed/Changed HEP based on:   [] positioning   [] body mechanics   [] transfers   [] heat/ice application    [] other:        Pain Level (0-10 scale) post treatment: denies pain    ASSESSMENT/Changes in Function: Mr. Cindi Bonilla ambulated into/out of clinic independently with [address] Pt with notable staggering with cane improved with cues to slow down speed. Pt tolerated progression of strengthening and balance training well.  Pt continues to have some inconsistency of R foot placement with tasks but improves with practice and cues for safety awareness. He continues to be highly motivated, continue per POC. Patient will continue to benefit from skilled PT services to modify and progress therapeutic interventions, address functional mobility deficits, address ROM deficits, address strength deficits, analyze and address soft tissue restrictions, analyze and cue movement patterns and instruct in home and community integration to attain remaining goals. [x]  See Plan of Care  []  See progress note/recertification  []  See Discharge Summary         Progress towards goals / Updated goals:   Working towards goals    PLAN  [x]  Upgrade activities as tolerated     [x]  Continue plan of care  []  Update interventions per flow sheet       []  Discharge due to:_  []  Other:_      Cinda Cr, PT, DPT, NCS 6/27/2022

## 2022-06-28 ENCOUNTER — APPOINTMENT (OUTPATIENT)
Dept: PHYSICAL THERAPY | Age: 77
End: 2022-06-28
Payer: MEDICARE

## 2022-06-29 ENCOUNTER — APPOINTMENT (OUTPATIENT)
Dept: PHYSICAL THERAPY | Age: 77
End: 2022-06-29
Payer: MEDICARE

## 2022-06-30 ENCOUNTER — APPOINTMENT (OUTPATIENT)
Dept: PHYSICAL THERAPY | Age: 77
End: 2022-06-30
Payer: MEDICARE

## 2022-07-05 ENCOUNTER — APPOINTMENT (OUTPATIENT)
Dept: PHYSICAL THERAPY | Age: 77
End: 2022-07-05
Payer: MEDICARE

## 2022-07-07 ENCOUNTER — HOSPITAL ENCOUNTER (OUTPATIENT)
Dept: PHYSICAL THERAPY | Age: 77
Discharge: HOME | End: 2022-07-07
Payer: MEDICARE

## 2022-07-07 PROCEDURE — 97530 THERAPEUTIC ACTIVITIES: CPT

## 2022-07-07 PROCEDURE — 97016 VASOPNEUMATIC DEVICE THERAPY: CPT

## 2022-07-07 PROCEDURE — 97112 NEUROMUSCULAR REEDUCATION: CPT

## 2022-07-07 PROCEDURE — 97110 THERAPEUTIC EXERCISES: CPT

## 2022-07-07 PROCEDURE — 97032 APPL MODALITY 1+ESTIM EA 15: CPT

## 2022-07-07 PROCEDURE — 97116 GAIT TRAINING THERAPY: CPT

## 2022-07-07 NOTE — PROGRESS NOTES
PT DAILY TREATMENT NOTE - [facility]     Patient Name: Charlotte Abreu  Date:2022  : 1945  [x]  Patient  Verified  Payor: Mirella Arciniegamichele / Plan: [address] / Product Type: Managed Care Medicare /    In time:2:00p  Out time:3:00p  Total Treatment Time (min): 60  Total Timed Codes (min): 45  1:1 Treatment Time ([address] only): 60   Visit #: 7  16    Treatment Area: Muscle weakness (generalized) [M62.81]    SUBJECTIVE  Pain Level (0-10 scale): 0/10  Any medication changes, allergies to medications, adverse drug reactions, diagnosis change, or new procedure performed?: [x] No    [] Yes (see summary sheet for update)  Subjective functional status/changes:   [] No changes reported  Pt reports ankle is bothering him, seems to swell throughout the day. OBJECTIVE    Modality rationale: decrease edema, decrease inflammation and decrease pain to improve the patients ability to perform daily activities without limitations.    Min Type Additional Details    [] Estim:  []Unatt       []IFC  []Premod                        []Other:  []w/ice   []w/heat  Position:  Location:    [] Estim: []Att    []TENS instruct  []NMES                    []Other:  []w/US   []w/ice   []w/heat  Position:  Location:    []  Traction: [] Cervical       []Lumbar                       [] Prone          []Supine                       []Intermittent   []Continuous Lbs:  [] before manual  [] after manual    []  Ultrasound: []Continuous   [] Pulsed                           []1MHz   []3MHz W/cm2:  Location:    []  Iontophoresis with dexamethasone         Location: [] Take home patch   [] In clinic    []  Ice     []  heat  []  Ice massage  []  Laser   []  Anodyne Position:  Location:    []  Laser with stim  []  Other:  Position:  Location:   15 [x]  Vasopneumatic Device Pressure:       [x] lo [] med [] hi   Temperature: [x] lo [] med [] hi   [x] Skin assessment post-treatment:  []intact []redness- no adverse reaction    []redness - adverse reaction:     15 min Therapeutic Exercise:  [x] See flow sheet :   Rationale: increase strength and improve coordination to improve the patients ability to perform daily activities. 20 min Therapeutic Activity:  [x]  See flow sheet :   Rationale: increase strength and improve coordination  to improve the patients ability to perform functional activities such as transfers, changing directions, etc without increased risk of falls. 10 min Gait Training:  _336__ feet with __SPC_ device on level surfaces with __SBA-CGA_ level of assist   Rationale: To improve gait speed, mechanics and decrease fall risk with proper use of AD          With   [] TE   [] TA   [] neuro   [] other: Patient Education: [x] Review HEP    [] Progressed/Changed HEP based on:   [] positioning   [] body mechanics   [] transfers   [] heat/ice application    [] other:      Other Objective/Functional Measures: Pt near end of 2 minute walk trial with decreased proprioception of RLE causing increased foot drop. 3+/5 DF MMT on RLE, 4/5 HS MMT on RLE. Pain Level (0-10 scale) post treatment: 0/10    ASSESSMENT/Changes in Function: Pt continues with good participation and motivation to progress with PT services. Has improvement of 0.85 m/s with gait trials today with occassional CGA as well as increase in R hamstring strength measured with MMT. Patient will continue to benefit from skilled PT services to modify and progress therapeutic interventions, address functional mobility deficits, address ROM deficits, address strength deficits, analyze and cue movement patterns and address imbalance/dizziness to attain remaining goals. [x]  See Plan of Care  []  See progress note/recertification  []  See Discharge Summary         Progress towards goals / Updated goals:  Pt is progressing towards goals.     PLAN  [x]  Upgrade activities as tolerated     [x]  Continue plan of care  [x]  Update interventions per flow sheet       []  Discharge due to:_  []  Other:_      Zaina Brooke, PTA 7/7/2022

## 2022-07-07 NOTE — PROGRESS NOTES
OT DAILY TREATMENT NOTE - [facility]     Patient Name: Yadi Lopez  Date:2022  : 1945  [x]  Patient  Verified  Payor: Liliam Bustos / Plan: William Marie / Product Type: Managed Care Medicare /    In time:1315  Out time:1400  Total Treatment Time (min): 45  Total Timed Codes (min): 45  1:1 Treatment Time ( [address] only): 45   Visit #: 8 of 16    Treatment Area: Muscle weakness (generalized) [M62.81]    SUBJECTIVE  Pain Level (0-10 scale): 0/10  Any medication changes, allergies to medications, adverse drug reactions, diagnosis change, or new procedure performed?: [x] No    [] Yes (see summary sheet for update)  Subjective functional status/changes:   [] No changes reported  Pt still has intermittent pain on the R side of face and shoulder per his report. He has been performing home activities and strengthening.     OBJECTIVE    Modality rationale: simulation trial to improve sensation in thumb and index R  to improve the patients ability to improve sensory perception to  objects   Min Type Additional Details    [] Estim:  []Unatt       []IFC  []Premod                        []Other:  []w/ice   []w/heat  Position:  Location:   15 [] Estim: []Att    []TENS instruct  [x]NMES                    []Other: 1 channel, [employer], 5 sec on 5 off settint 7-8 thumb and index total 5 mins each []w/US   []w/ice   []w/heat  Position:  Location:    []  Traction: [] Cervical       []Lumbar                       [] Prone          []Supine                       []Intermittent   []Continuous Lbs:  [] before manual  [] after manual    []  Ultrasound: []Continuous   [] Pulsed                           []1MHz   []3MHz W/cm2:  Location:    []  Iontophoresis with dexamethasone         Location: [] Take home patch   [] In clinic    []  Ice     []  heat  []  Ice massage  []  Laser   []  Anodyne Position:  Location:    []  Laser with stim  []  Other:  Position:  Location:    []  Vasopneumatic Device Pressure: [] lo [] med [] hi   Temperature: [] lo [] med [] hi   [x] Skin assessment post-treatment:  [x]intact []redness- no adverse reaction    []redness - adverse reaction:         20 min Therapeutic Activity:  []  See flow sheet : Pt completed painting on birdhouse project using R hand with paint brush but needed OTR to assist in proper placement of brush in fingers. His middle finger is least coordinated in the 3 finger pinch pattern. He was able to perform 9 hole peg test and in-hand manipulation activities. Practiced pencil flipping w/o using wrist and using mirror technique with R/L hands together. Picked up small beads x 5 and able to hold for minimum of 5 seconds with 3 of 5 placed on  w/o dropping. He practiced turning over game pieces using both hands. Rationale: improve coordination  to improve the patients ability to improve function of R hand     10 min Neuromuscular Re-education:  []  See flow sheet : while stim on OTR applied joint compression to the digit I and II R hand during 5 sec on cycle. Rationale: increase proprioception  to improve the patients ability to improve sensation to  objects and hold              With   [] TE   [x] TA   [] neuro   [] other: Patient Education: [x] Review HEP    [] Progressed/Changed HEP based on:   [] positioning   [] body mechanics   [] transfers   [] heat/ice application    [] other: continue, added warm up exercises and performed in clinic: thumb to each digit and back bilateral x 3; full fist x 3; hands rubbed together for 3 count and palms/fingertips pressed onto table for a 3 count      Other Objective/Functional Measures:    R 50lbs 2nd wrung (down 4 lbs)  9 hole peg test 1 min 53 sec R    Pain Level (0-10 scale) post treatment: none    ASSESSMENT/Changes in Function: Pt has improvement as is functionally using hand more to eat w/o built up handled utensils.  He continues to have diminished sensation in the R hand reporting feels \"cold\" and \"numb\". He is able to perform thumb to index picking up of objects but still has difficulty with manipulating or turning objects. Attempted light e stim to index then thumb to improve sensation perception. Will inquire next session if he felt any improvement after session. Patient will continue to benefit from skilled OT services to modify and progress therapeutic interventions, address ROM deficits, address strength deficits, analyze and address soft tissue restrictions, analyze and cue movement patterns and analyze and modify body mechanics/ergonomics to attain remaining goals. [x]  See Plan of Care  []  See progress note/recertification  []  See Discharge Summary           Progress towards goals / Updated goals: continue  Short Term Goals: To be accomplished in 8 treatments:  1. Pt reports compliance w/HEP MET  2. Pt demonstrates 45lbs of R  strength.  MET 6/9/33  3. Pt has improved sensation in the R thumb based on monofilament testing to allow him to  small objects 4 of 5 times. MET 7/7/22  4. Pt is able to perform 9 hold peg test using R hand in less than 1 min 30 seconds. 5. Pt is able to perform slow 10 count shoulder movements without jerking motion 3 of 4 attempts. MET      Long Term Goals: To be accomplished in 16 treatments:  1. Pt has 60lbs of R hand  needed to use tools in his shop. 2. Pt has fine motor pinch strength increased by 2lbs on R hand. 3. Pt is able to use knife/fork to cut food w/o need for built up handles. 4. Pt is able to perform screw  tasks using the R hand safely for 1 continuous minute. MET and also using drill and impact .   5. Pt is able to use his right hand to perform 9 hold peg test in under one minute.          PLAN  [x]  Upgrade activities as tolerated     [x]  Continue plan of care  []  Update interventions per flow sheet       []  Discharge due to:_  []  Other:_      Dona Armstrong OT 7/7/2022

## 2022-07-12 ENCOUNTER — APPOINTMENT (OUTPATIENT)
Dept: PHYSICAL THERAPY | Age: 77
End: 2022-07-12
Payer: MEDICARE

## 2022-07-13 NOTE — PROGRESS NOTES
[employer]   [address]  Phone: [phone]      Fax:  [phone]    Progress Note    Name: Rupali Dawson   : 1945   MD: Chandler Thakkar MD       Treatment Diagnosis: Muscle weakness (generalized) [M62.81]  Start of Care: 22    Visits from Start of Care: 7      Summary of Care:Patient is progressing his mobility. He is ambulating with a SPC in and out of PT clinic independently with notable staggering. He does improve with verbal cues to slow down his speed. He has improvement of 0.85 m/s with gait trials with CGA. He demonstrates improved hamstring strength. MMT R DF 3+/5, R Hamstring 4/5. Gait speed 102.5 m/ 2 min. He is motivated to improve his mobility and has good participation in therapy. Assessment / Recommendations: Continue skilled PT up to 20 visits to progress mobility and work towards goals. Short Term Goals: To be accomplished in 8 visits:  1. Pt will demonstrate gait speed >0.5m/s with AD for improved gait speed and decreased fall risk. 2. Pt will perform HEP with Min A from family consistently for improved independence in therex/care plan. 3. Pt will report no falls this plan of care for improved safety and decreased impulsivity with mobility.     Long Term Goals: To be accomplished in 24 visits:  1. Pt will demonstrate gait speed >1.0m/s with AD for improved gait speed and decreased fall risk. 2. Pt will perform HEP independently for improved transition to community level exercise program.  3. Pt will tolerate Gomez Balance test without AD for community level fall risk prediction.       Stacey Pollock, PT 2022 1:56 PM    ________________________________________________________________________  NOTE TO PHYSICIAN:  Please complete the following and fax to: [facility]: Fax: [phone]. [facility] for your records.   If you are unable to process this request in 24 hours, please contact our office.        ____ I have read the above report and request that my patient continue therapy with the following changes/special instructions:  ____ I have read the above report and request that my patient be discharged from therapy    Physician's Signature:_________________ Date:___________Time:__________

## 2022-07-14 ENCOUNTER — APPOINTMENT (OUTPATIENT)
Dept: PHYSICAL THERAPY | Age: 77
End: 2022-07-14
Payer: MEDICARE

## 2022-07-18 ENCOUNTER — HOSPITAL ENCOUNTER (OUTPATIENT)
Dept: PHYSICAL THERAPY | Age: 77
Discharge: HOME | End: 2022-07-18
Payer: MEDICARE

## 2022-07-18 PROCEDURE — 97110 THERAPEUTIC EXERCISES: CPT

## 2022-07-18 PROCEDURE — 97112 NEUROMUSCULAR REEDUCATION: CPT

## 2022-07-18 PROCEDURE — 97140 MANUAL THERAPY 1/> REGIONS: CPT

## 2022-07-18 PROCEDURE — 97530 THERAPEUTIC ACTIVITIES: CPT

## 2022-07-18 NOTE — PROGRESS NOTES
OT DAILY TREATMENT NOTE - [facility]     Patient Name: Mary Go  Date:2022  : 1945  [x]  Patient  Verified  Payor: Ofelia Sahu / Plan: Rafael Ivette / Product Type: Managed Care Medicare /    In time: 679 Out time:1355  Total Treatment Time (min): 43  Total Timed Codes (min): 43  1:1 Treatment Time ([facility] only): 43  Visit #: 9 of 16    Treatment Area: Muscle weakness (generalized) [M62.81]    SUBJECTIVE  Pain Level (0-10 scale): 3-4/10  Any medication changes, allergies to medications, adverse drug reactions, diagnosis change, or new procedure performed?: [x] No    [] Yes (see summary sheet for update)  Subjective functional status/changes:   [] No changes reported  Pt has a headache. Gian Goddard its more in the front of his face. Pain not constant, every now and then. The original pain in the R shoulder is resolved and he also had some pain in back of head which has resolved. Uncertain about next neuro but sees PCM this week. Sensation in the arm is slowly improving. He now has sensation at the elbow which he did not before. OBJECTIVE    15 []  Ice     [x]  heat  []  Ice massage  []  Laser   []  Anodyne Position: sitting  Location:draped over shoulder R    []  Laser with stim  []  Other:  Position:  Location:    []  Vasopneumatic Device Pressure:       [] lo [] med [] hi   Temperature: [] lo [] med [] hi   [x] Skin assessment post-treatment:  [x]intact []redness- no adverse reaction    []redness - adverse reaction:         30 min Therapeutic Activity:  []  See flow sheet : gripper 7lbs x 22 reps; practiced fine motor coord tasks with pencil, scissors, stickers.    Rationale: increase strength and improve coordination  to improve the patients ability to use R hand     15 min Manual   []  See flow sheet : massage wth Raymond pain relieving cream To trigger point in the R superior scapular border; trigger point release   Rationale: decrease pain and tightness  to improve the patients ability to move R shoulder            With   [x] TE   [] TA   [] neuro   [] other: Patient Education: [x] Review HEP    [] Progressed/Changed HEP based on:   [] positioning   [] body mechanics   [] transfers   [] heat/ice application    [] other:      Other Objective/Functional Measures:    R hand 50lbs  Pain Level (0-10 scale) post treatment: still 4/10    ASSESSMENT/Changes in Function: Pt has complaints of headache intermittent. He has trigger point in the superior R scapular border. He has no change in strength. Worked on strength, coord and trigger point today. Patient will continue to benefit from skilled OT services to modify and progress therapeutic interventions, address ROM deficits, address strength deficits, analyze and address soft tissue restrictions, analyze and cue movement patterns and analyze and modify body mechanics/ergonomics to attain remaining goals. []  See Plan of Care  [x]  See progress note/recertification  []  See Discharge Summary         Progress towards goals / Updated goals: continue  Short Term Goals: To be accomplished in 8 treatments:  1. Pt reports compliance w/HEP MET  2. Pt demonstrates 45lbs of R  strength.  MET 6/9/33  3. Pt has improved sensation in the R thumb based on monofilament testing to allow him to  small objects 4 of 5 times. MET 7/7/22  4. Pt is able to perform 9 hold peg test using R hand in less than 1 min 30 seconds. 5. Pt is able to perform slow 10 count shoulder movements without jerking motion 3 of 4 attempts. MET      Long Term Goals: To be accomplished in 16 treatments:  1. Pt has 60lbs of R hand  needed to use tools in his shop. 2. Pt has fine motor pinch strength increased by 2lbs on R hand. 3. Pt is able to use knife/fork to cut food w/o need for built up handles. 4. Pt is able to perform screw  tasks using the R hand safely for 1 continuous minute. MET and also using drill and impact .   5. Pt is able to use his right hand to perform 9 hold peg test in under one minute.       PLAN  [x]  Upgrade activities as tolerated     [x]  Continue plan of care  []  Update interventions per flow sheet       []  Discharge due to:_  []  Other:_      Lakesha Carnes OT 7/18/2022

## 2022-07-18 NOTE — PROGRESS NOTES
PT DAILY TREATMENT NOTE - [facility]     Patient Name: Alexandra Zavaleta  Date:2022  : 1945  [x]  Patient  Verified  Payor: Jose Cruz / Plan: Dot Vincent / Product Type: Managed Care Medicare /    In time:1400  Out time:1445  Total Treatment Time (min): 45  Total Timed Codes (min): 45  1:1 Treatment Time ( [address] only): 45   Visit #: 8 of 16    Treatment Area: Muscle weakness (generalized) [M62.81]    SUBJECTIVE  Pain Level (0-10 scale): 0  Any medication changes, allergies to medications, adverse drug reactions, diagnosis change, or new procedure performed?: [x] No    [] Yes (see summary sheet for update)  Subjective functional status/changes:   [x] No changes reported  \"I haven't had no falls since the first one\"    OBJECTIVE:    15 min Therapeutic Activity:  [x]  See flow sheet :   Rationale: increase strength, improve coordination and improve balance  to improve the patients ability to perform community level gait tasks with normalized mechanics and decreased risk for falls. 30 min Neuromuscular Re-education:  [x]  See flow sheet :   Rationale: increase strength, improve coordination and improve balance  to improve the patients ability to normalize gait mechanics, decrease limb ataxia, and improve fall recovery strategies. With   [] TE   [x] TA   [] neuro   [] other: Patient Education: [x] Review HEP    [] Progressed/Changed HEP based on:   [] positioning   [] body mechanics   [] transfers   [] heat/ice application    [] other:        Pain Level (0-10 scale) post treatment: denies pain     ASSESSMENT/Changes in Function: Mr. Mily Laureano ambulated in/out of clinic with intermittent use of SPC for balance. Pt with continued ataxia of gait, veering to R with fatigue. Pt tolerated dynamic balance and gait challenges well during session. Incorporated outdoor interventions for balance challenge on grassy surface.  Pt with 1 major LOB requiring assist to correct, otherwise correcting LOBs independently. Pt progressing in balance reactions and dynamic gait this POC. He reports continued performance of HEP and gait training for exercise at home. Will continue per POC. Patient will continue to benefit from skilled PT services to modify and progress therapeutic interventions, address functional mobility deficits, address ROM deficits, address strength deficits, analyze and cue movement patterns, assess and modify postural abnormalities and instruct in home and community integration to attain remaining goals. [x]  See Plan of Care  []  See progress note/recertification  []  See Discharge Summary         Progress towards goals / Updated goals:   Working towards goals    PLAN  [x]  Upgrade activities as tolerated     [x]  Continue plan of care  [x]  Update interventions per flow sheet       []  Discharge due to:_  []  Other:_      Jas Pascual, PT, DPT, NCS 7/18/2022

## 2022-07-20 ENCOUNTER — OFFICE VISIT (OUTPATIENT)
Dept: FAMILY MEDICINE CLINIC | Age: 77
End: 2022-07-20
Payer: MEDICARE

## 2022-07-20 VITALS
WEIGHT: 180.38 LBS | TEMPERATURE: 97.5 F | RESPIRATION RATE: 18 BRPM | SYSTOLIC BLOOD PRESSURE: 128 MMHG | HEIGHT: 68 IN | BODY MASS INDEX: 27.34 KG/M2 | HEART RATE: 74 BPM | DIASTOLIC BLOOD PRESSURE: 66 MMHG | OXYGEN SATURATION: 96 %

## 2022-07-20 DIAGNOSIS — E11.65 TYPE 2 DIABETES MELLITUS WITH HYPERGLYCEMIA, WITHOUT LONG-TERM CURRENT USE OF INSULIN (HCC): Primary | ICD-10-CM

## 2022-07-20 DIAGNOSIS — I10 ESSENTIAL HYPERTENSION: ICD-10-CM

## 2022-07-20 DIAGNOSIS — E78.00 HYPERCHOLESTEROLEMIA: ICD-10-CM

## 2022-07-20 DIAGNOSIS — Z86.73 HISTORY OF CVA (CEREBROVASCULAR ACCIDENT): ICD-10-CM

## 2022-07-20 PROCEDURE — 1123F ACP DISCUSS/DSCN MKR DOCD: CPT | Performed by: NURSE PRACTITIONER

## 2022-07-20 PROCEDURE — 99214 OFFICE O/P EST MOD 30 MIN: CPT | Performed by: NURSE PRACTITIONER

## 2022-07-20 PROCEDURE — 3052F HG A1C>EQUAL 8.0%<EQUAL 9.0%: CPT | Performed by: NURSE PRACTITIONER

## 2022-07-20 PROCEDURE — 36415 COLL VENOUS BLD VENIPUNCTURE: CPT | Performed by: NURSE PRACTITIONER

## 2022-07-20 NOTE — PROGRESS NOTES
1. \"Have you been to the ER, urgent care clinic since your last visit? Hospitalized since your last visit? \" No    2. \"Have you seen or consulted any other health care providers outside of the [address] [place] [address] since your last visit? \" No     3. For patients aged 39-70: Has the patient had a colonoscopy / FIT/ Cologuard? Yes - Care Gap present. Most recent result on file      If the patient is female:    4. For patients aged 41-77: Has the patient had a mammogram within the past 2 years? NA - based on age or sex      11. For patients aged 21-65: Has the patient had a pap smear? NA - based on age or sex    Chief Complaint   Patient presents with    Follow-up           Hypertension    Diabetes     Visit Vitals  /66 (BP 1 Location: Left upper arm, BP Patient Position: Sitting)   Pulse 74   Temp 97.5 °F (36.4 °C) (Temporal)   Resp 18   Ht 5' 8\" (1.727 m)   Wt 180 lb 6 oz (81.8 kg)   SpO2 96%   BMI 27.43 kg/m²     0928 Labs drawn in left arm per Chris Landry NP's orders. Patient tolerated well.

## 2022-07-20 NOTE — PROGRESS NOTES
Subjective:     CC: HTN, Diabetes    Vahid Daria is a 77 y.o. male who is a patient of Dr Soumya Soto who presents today for a 6 month follow up for HTN and diabetes. He is accompanied by his wife today. Hx of CVA  He was hospitalized in April for a lacunar infarct on the left cerebellum and extensive chronic small vessel diseases. He has been maintained on aspirin and Plavix therapy for 90-days and per his neurologist is now able to come off the Plavix. He will cont the daily baby aspirin. At his last appt with Dr Wes Asher he was c/o weakness and decreased coordination on the right side of his body. He has been doing PT and states his symptoms are improving. He will have occas dizziness upon standing too quickly but this will briefly resolve. He has also noticed some pain in the right side of his face over the trigeminal nerve but it is tolerable and he is on Gabapentin. HTN- BP stable today. Taking meds daily. His Flomax was d/c;d at the last appt due to hypotension. Type 2 Diabetes  Lab Results   Component Value Date/Time    Hemoglobin A1c 8.2 (H) 01/26/2022 10:00 AM    Hemoglobin A1c, External 9.4 04/05/2019 12:00 AM     He is on Metformin 1000mg BID. BS at home have not been checked. States he has probably been eating the wrong foods. He is due for an A1C check        HLD  Well controlled on Lipitor 40mg daily.    Lab Results   Component Value Date/Time    Cholesterol, total 139 01/26/2022 10:00 AM    HDL Cholesterol 48 01/26/2022 10:00 AM    LDL-C, External 124.4 04/02/2019 12:00 AM    LDL, calculated 62 01/26/2022 10:00 AM    VLDL, calculated 29 01/26/2022 10:00 AM    Triglyceride 145 01/26/2022 10:00 AM    CHOL/HDL Ratio 2.9 01/26/2022 10:00 AM         Patient Active Problem List   Diagnosis Code    Controlled type 2 diabetes mellitus without complication, without long-term current use of insulin (HCC) E11.9    Essential hypertension I10    Hypercholesterolemia E78.00    Peripheral vascular disease [facility]) I73.9    History of carotid endarterectomy Z98.890    Former smoker Z87.891    Tinea unguium B35.1    Benign prostatic hyperplasia with urinary frequency N40.1, R35.0    Cerebrovascular accident (CVA) due to stenosis of right cerebellar artery (HCC) I63.541       Past Medical History:   Diagnosis Date    DM (diabetes mellitus) ([facility] [address] [facility]     HTN (hypertension)     Positive FIT (fecal immunochemical test) 10/7/2019    [facility]          Current Outpatient Medications:     gabapentin (NEURONTIN) 300 mg capsule, TAKE 1 CAPSULE 3 TIMES A DAY, Disp: , Rfl:     famotidine (PEPCID) 20 mg tablet, Take 20 mg by mouth nightly., Disp: , Rfl:     Senna Laxative 8.6 mg tablet, TAKE 1 TABLET TWICE A DAY AS NEEDED FOR CONSTIPATION, Disp: , Rfl:     clopidogreL (PLAVIX) 75 mg tab, Take 75 mg by mouth daily. , Disp: , Rfl:     amLODIPine (NORVASC) 5 mg tablet, Take 1 Tab by mouth daily. (Patient taking differently: 10 mg. Take 1 Tab by mouth daily.), Disp: 90 Tablet, Rfl: 1    metFORMIN ER (GLUCOPHAGE XR) 500 mg tablet, 1 twice daily with meals (Patient taking differently: 500 mg. 2 tables twice daily), Disp: 180 Tablet, Rfl: 1    tamsulosin (FLOMAX) 0.4 mg capsule, Take 1 Capsule by mouth daily. , Disp: 90 Capsule, Rfl: 1    ketoconazole (NIZORAL) 2 % topical cream, Apply  to affected area two (2) times a day., Disp: 30 g, Rfl: 5    aspirin delayed-release 81 mg tablet, Take 1 Tab by mouth daily. , Disp: 90 Tab, Rfl: 1    atorvastatin (LIPITOR) 40 mg tablet, Take 1 Tab by mouth daily. , Disp: 90 Tab, Rfl: 1    Blood-Glucose Meter monitoring kit, Use daily as directed to monitor glucose, Disp: 1 Kit, Rfl: 0    No Known Allergies    Past Surgical History:   Procedure Laterality Date    HX CAROTID ENDARTERECTOMY Right 06/2019    HX CAROTID ENDARTERECTOMY Left 11/05/2019       Social History     Tobacco Use   Smoking Status Former   Smokeless Tobacco Never       Social History     Socioeconomic History    Marital status:  Tobacco Use    Smoking status: Former    Smokeless tobacco: Never   Substance and Sexual Activity    Alcohol use: Yes     Alcohol/week: 3.0 standard drinks     Types: 3 Cans of beer per week    Drug use: Never    Sexual activity: Yes       Family History   Problem Relation Age of Onset    Diabetes Father        ROS:  Gen: denies fever, chills, or fatigue  HEENT:+right sided facial H/A, denies nasal congestion, ear pain, or sore throat  Resp: denies dyspnea, cough, or wheezing  CV: denies chest pain, pressure, or palpitations  Extremeties: denies edema  Musculoskeletal: +chronic right shoulder pain  Neuro: denies numbness/tingling +weakness to right upper and lower extremities, occasional orthostatic dizziness  Skin: denies rashes or new lesions   Psych: denies anxiety, depression, rory, or other changes in mood    Objective:     Visit Vitals  /66 (BP 1 Location: Left upper arm, BP Patient Position: Sitting)   Pulse 74   Temp 97.5 °F (36.4 °C) (Temporal)   Resp 18   Ht 5' 8\" (1.727 m)   Wt 180 lb 6 oz (81.8 kg)   SpO2 96%   BMI 27.43 kg/m²       General: Alert and oriented. No acute distress. Well nourished. HEENT :   Eyes: Sclera white, conjunctiva clear. PERRLA. Extra ocular movements intact. Neck: Supple with FROM. No carotid bruits  Lungs: Breathing even and unlabored. All lobes clear to auscultation bilaterally   Heart :RRR, S1 and S2 normal intensity, no extra heart sounds  Extremities: Non-edematous  Musculoskeletal: No joint pain, heat, erythema, or swelling. Neuro: Cranial nerves grossly normal.  Mental status: Cognition and speech intact. Sensory: Sensation intact. .  Motor: Good  strength bilaterally. Able to ambulate with a cane  Psych: Mood and thought content appropriate for situation. Dressed appropriately and with good hygiene. Skin: Warm, dry, and intact. No lesions or discoloration.       Assessment/ Plan:     History of CVA  R sided weakness is improving with PT  Advised to keep tight control over BP and BS  90 day course of Plavix is complete- he will D/C  Cont ASA 81mg daily  Cont Statin  F/U with neuro as scheduled    HTN  BP at goal  Cont current meds  Low sodium diet  Go to ER or RTO for CP, SOB, swelling, or worsening dizziness    HLD  Well controlled  Cont statin  Low fat diet    Type 2 Diabetes  Check A1C  Needs to start checking BS at home  Encouraged diabetic diet  Cont Metformin 1000mg BID  F/U 3 months        Verbal and written instructions (see AVS) provided. Patient expresses understanding of diagnosis and treatment plan. Health Maintenance Due   Topic Date Due    COVID-19 Vaccine (1) Never done    Eye Exam Retinal or Dilated  Never done    DTaP/Tdap/Td series (1 - Tdap) Never done    Shingrix Vaccine Age 50> (1 of 2) Never done    Pneumococcal 65+ years (2 - PPSV23 or PCV20) 12/04/2018    Colorectal Cancer Screening Combo  09/18/2020    Foot Exam Q1  08/06/2022               Aileen Garcia NP

## 2022-07-21 ENCOUNTER — HOSPITAL ENCOUNTER (OUTPATIENT)
Dept: PHYSICAL THERAPY | Age: 77
Discharge: HOME | End: 2022-07-21
Payer: MEDICARE

## 2022-07-21 ENCOUNTER — TELEPHONE (OUTPATIENT)
Dept: FAMILY MEDICINE CLINIC | Age: 77
End: 2022-07-21

## 2022-07-21 LAB
EST. AVERAGE GLUCOSE BLD GHB EST-MCNC: 166 MG/DL
HBA1C MFR BLD: 7.4 % (ref 4–5.6)

## 2022-07-21 PROCEDURE — 97112 NEUROMUSCULAR REEDUCATION: CPT

## 2022-07-21 PROCEDURE — 97110 THERAPEUTIC EXERCISES: CPT

## 2022-07-21 PROCEDURE — 97168 OT RE-EVAL EST PLAN CARE: CPT

## 2022-07-21 NOTE — PROGRESS NOTES
OT DAILY TREATMENT NOTE - [facility]     Patient Name: Brittney Jorge  Date:2022  : 1945  [x]  Patient  Verified  Payor: Ashly Clock / Plan: Leandro Craig / Product Type: Managed Care Medicare /    In time:1308  Out time:1340  Total Treatment Time (min): 42  Total Timed Codes (min): 42  1:1 Treatment Time ( only): 42   Visit #: 10 of 16    Treatment Area: Muscle weakness (generalized) [M62.81]    SUBJECTIVE  Pain Level (0-10 scale): mild facial pain on R side under eye and on cheek  Any medication changes, allergies to medications, adverse drug reactions, diagnosis change, or new procedure performed?: [x] No    [] Yes (see summary sheet for update)  Subjective functional status/changes:   [] No changes reported  Pt continues to have pain in the R side of face which moves and is intermittent. He no longer has pain in the R shoulder or neck. He requests to decrease sessions to 1 x week.     OBJECTIVE    15 min []Eval                  [x]Re-Eval\" see measures       25 min Therapeutic Exercise:  [] See flow sheet : practiced cutting using green putty, practiced rolling putty up into small balls using only R hand/fingers; discussed progress and goals; discussed future visits, discussed use of hand for everyday activities; increased HEP to green band and showed him bicep and tricep strengthening    Rationale: increase strength and improve coordination to improve the patients ability to use R hand for tasks            With    TE   [] TA   [] neuro   [] other: Patient Education: [x] Review HEP    [x] Progressed/Changed HEP based on: MMT, ready for green theraband  [] positioning   [] body mechanics   [] transfers   [] heat/ice application    [] other:      Other Objective/Functional Measures:     R 66lbs (was 54lbs and initial was 34lbs)   L 70lbs  Lateral pinch R 20.5lbs (ipl47yc and initial 16lb; L 19lbs)  Tripod pinch 19.5lbs (was 18lbs initial was 5lbs R and slipped of: L 15lbs)  Tip pinch 13lbs (irs10apy R and L hand 13lbs)  Monoflimanet test:  L hand 3.22  R hand index 3.84 (nc), ring 3.61 and small finger 3.61 (was 3.84);  R  middle finger 4.08 (was 3.84)  Thumb 4.08 (was 4.56 thumb)   AROM in R shoulder is 150 degrees flexion  MMT R shoulder 4/5 flex and abduction (was 3+/5)  9 hole peg test  1:25 sec (was 2: 06 sec R 33 sec L)     Pain Level (0-10 scale) post treatment: no change    ASSESSMENT/Changes in Function: Pt has improvements in  and pinch. His 9 hole peg test has improved. He is using his hand more. Patient will continue to benefit from skilled OT services to modify and progress therapeutic interventions, address ROM deficits, address strength deficits, analyze and address soft tissue restrictions, analyze and cue movement patterns, and analyze and modify body mechanics/ergonomics to attain remaining goals. [x]  See Plan of Care  [x]  See progress note/recertification  []  See Discharge Summary    Progress towards goals / Updated goals: continue  Short Term Goals: To be accomplished in 8 treatments:  1. Pt reports compliance w/HEP MET  2. Pt demonstrates 45lbs of R  strength. MET 6/9/33  3. Pt has improved sensation in the R thumb based on monofilament testing to allow him to  small objects 4 of 5 times. MET 7/7/22  4. Pt is able to perform 9 hold peg test using R hand in less than 1 min 30 seconds. MET 7/21/22  5. Pt is able to perform slow 10 count shoulder movements without jerking motion 3 of 4 attempts. MET     Long Term Goals: To be accomplished in 16 treatments:  1. Pt has 60lbs of R hand  needed to use tools in his shop. MET 7/21/22  2. Pt has fine motor pinch strength increased by 2lbs on R hand. Partially Met  3. Pt is able to use knife/fork to cut food w/o need for built up handles. MET 7/21/22  4. Pt is able to perform screw  tasks using the R hand safely for 1 continuous minute. MET and also using drill and impact .   5. Pt is able to use his right hand to perform 9 hold peg test in under one minute.  CONTINUE       PLAN  [x]  Upgrade activities as tolerated     [x]  Continue plan of care  []  Update interventions per flow sheet       []  Discharge due to:_  []  Other:_      Merry Black, OT 7/21/2022

## 2022-07-21 NOTE — PROGRESS NOTES
[facility]  Dionicio Carlson, [address] [place] [address] Road  Phone: [phone] Fax: [phone]    Occupational Therapy Progress Note    Name: Dudley Turner   : 1945   MD: Delilah Yang MD       Treatment Diagnosis: Muscle weakness (generalized) [M62.81]  Start of Care: 25 May 2021    Visits from Start of Care: 10  Missed Visits: 1    Summary of Care:Pt has received therapy including thera-ex, neuromuscular re-ed, modality, manual therapy, education and thera activities. Assessment / Recommendations: He is improved. He is using hand more for regular tasks although his sensation is not completely returned. He has been educated on use of vision and caution for using tools and sharps due to sensation loss. He said sensation is returning into forearm as before was numb above elbow.  R 66lbs (was 54lbs and initial was 34lbs);  L 70lbs  Lateral pinch R 20.5lbs ([id number] and initial 16lb; L 19lbs)  Tripod pinch 19.5lbs (was 18lbs initial was 5lbs R and slipped of: L 15lbs)  Tip pinch 13lbs ([id number] R and L hand 13lbs)  Monofilament test:  L hand 3.22  R hand index 3.84 (nc), ring 3.61 and small finger 3.61 (was 3.84);  R  middle finger 4.08 (was 3.84)  Thumb 4.08 (was 4.56 thumb)   AROM in R shoulder is 150 degrees flexion  MMT R shoulder 4/5 flex and abduction (was 3+/5)  9 hole peg test  1:25 sec (was 2: 06 sec R 33 sec L)    Status at last note/certification:Progress towards goals / Updated goals: continue  Short Term Goals: To be accomplished in 8 treatments:  1. Pt reports compliance w/HEP MET  2. Pt demonstrates 45lbs of R  strength. MET 33  3. Pt has improved sensation in the R thumb based on monofilament testing to allow him to  small objects 4 of 5 times. MET 22  4. Pt is able to perform 9 hold peg test using R hand in less than 1 min 30 seconds. MET 22  5.  Pt is able to perform slow 10 count shoulder movements without jerking motion 3 of 4 attempts. MET     Long Term Goals: To be accomplished in 16 treatments:  1. Pt has 60lbs of R hand  needed to use tools in his shop. MET 7/21/22  2. Pt has fine motor pinch strength increased by 2lbs on R hand. Partially Met  3. Pt is able to use knife/fork to cut food w/o need for built up handles. MET 7/21/22  4. Pt is able to perform screw  tasks using the R hand safely for 1 continuous minute. MET and also using drill and impact . 5. Pt is able to use his right hand to perform 9 hold peg test in under one minute. CONTINUE     Other: will continue therapy. Pt requesting decrease to 1 x week for the next month. Gilles Tanner, OT 1/99/7017       I certify that the above Therapy Services are being furnished while the patient is under my care. I agree with the treatment plan and certify that this therapy is necessary. Physician's Signature:____________________  Date:____________Time: _________                                             Stu Avitia MD     Please sign and return to: [facility]  [address] [place], [place], [address] [facility]:  [address] [place] [address] Street: [phone]

## 2022-07-21 NOTE — PROGRESS NOTES
PT DAILY TREATMENT NOTE - [facility]     Patient Name: Tex Harry  Date:2022  : 1945  [x]  Patient  Verified  Payor: Cyndee Jackedin / Plan: [employer]  / Product Type: Managed Care Medicare /    In time:1400  Out time:1445  Total Treatment Time (min): 45  Total Timed Codes (min): 45  1:1 Treatment Time ( [address] only): 45   Visit #: 9 of 16    Treatment Area: Muscle weakness (generalized) [M62.81]    SUBJECTIVE  Pain Level (0-10 scale): 0  Any medication changes, allergies to medications, adverse drug reactions, diagnosis change, or new procedure performed?: [x] No    [] Yes (see summary sheet for update)  Subjective functional status/changes:   [x] No changes reported  \"I'm feeling a lot stronger than I did at first.\"    OBJECTIVE:    30 min Therapeutic Activity:  [x]  See flow sheet :   Rationale: increase strength, improve coordination and improve balance  to improve the patients ability to perform community level gait tasks with normalized mechanics and decreased risk for falls. 15 min Neuromuscular Re-education:  [x]  See flow sheet :   Rationale: increase strength, improve coordination and improve balance  to improve the patients ability to normalize gait mechanics, decrease limb ataxia, and improve fall recovery strategies. With   [] TE   [x] TA   [] neuro   [] other: Patient Education: [x] Review HEP    [] Progressed/Changed HEP based on:   [] positioning   [] body mechanics   [] transfers   [] heat/ice application    [] other:        Pain Level (0-10 scale) post treatment: denies pain     ASSESSMENT/Changes in Function:  Continued ataxia, which increases with fatigue. 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 Pt reports no fall, since previously noted. Significant improvement in sit to stand safety (no support). Will continue per POC.      Patient will continue to benefit from skilled PT services to modify and progress therapeutic interventions, address functional mobility deficits, address ROM deficits, address strength deficits, analyze and cue movement patterns, assess and modify postural abnormalities and instruct in home and community integration to attain remaining goals. [x]  See Plan of Care  []  See progress note/recertification  []  See Discharge Summary         Progress towards goals / Updated goals:   Working towards goals    PLAN  [x]  Upgrade activities as tolerated     [x]  Continue plan of care  [x]  Update interventions per flow sheet       []  Discharge due to:_  []  Other:_      Nivia Collazo PTA, DPT, NCS 7/21/2022

## 2022-07-26 ENCOUNTER — HOSPITAL ENCOUNTER (OUTPATIENT)
Dept: PHYSICAL THERAPY | Age: 77
Discharge: HOME | End: 2022-07-26
Payer: MEDICARE

## 2022-07-26 PROCEDURE — 97112 NEUROMUSCULAR REEDUCATION: CPT

## 2022-07-26 PROCEDURE — 97110 THERAPEUTIC EXERCISES: CPT

## 2022-07-26 PROCEDURE — 97530 THERAPEUTIC ACTIVITIES: CPT

## 2022-07-26 NOTE — PROGRESS NOTES
PT DAILY TREATMENT NOTE - [facility]     Patient Name: Cherri Harrison  Date:2022  : 1945  [x]  Patient  Verified  Payor: Wale Hong / Plan: Arik Ruiz / Product Type: Managed Care Medicare /    In time:1400  Out time:1445  Total Treatment Time (min): 45  Total Timed Codes (min): 45  1:1 Treatment Time ( [address] only): 45   Visit #: 10 of 16    Treatment Area: Muscle weakness (generalized) [M62.81]    SUBJECTIVE  Pain Level (0-10 scale): 0  Any medication changes, allergies to medications, adverse drug reactions, diagnosis change, or new procedure performed?: [x] No    [] Yes (see summary sheet for update)  Subjective functional status/changes:   [x] No changes reported  Went for a walk the other night. (About a 1/4 mile)    OBJECTIVE:    30 min Therapeutic Activity:  [x]  See flow sheet :   Rationale: increase strength, improve coordination and improve balance  to improve the patients ability to perform community level gait tasks with normalized mechanics and decreased risk for falls. 15 min Neuromuscular Re-education:  [x]  See flow sheet :   Rationale: increase strength, improve coordination and improve balance  to improve the patients ability to normalize gait mechanics, decrease limb ataxia, and improve fall recovery strategies. With   [] TE   [x] TA   [] neuro   [] other: Patient Education: [x] Review HEP    [] Progressed/Changed HEP based on:   [] positioning   [] body mechanics   [] transfers   [] heat/ice application    [] other:        Pain Level (0-10 scale) post treatment: denies pain     ASSESSMENT/Changes in Function:  continued participation and improvement of gait quality. Pt report decreasing number LOB episode, with general mobility. Pt want to continue with PT.      Patient will continue to benefit from skilled PT services to modify and progress therapeutic interventions, address functional mobility deficits, address ROM deficits, address strength deficits, analyze and cue movement patterns, assess and modify postural abnormalities and instruct in home and community integration to attain remaining goals.      [x]  See Plan of Care  []  See progress note/recertification  []  See Discharge Summary           PLAN  [x]  Upgrade activities as tolerated     [x]  Continue plan of care  [x]  Update interventions per flow sheet       []  Discharge due to:_  []  Other:_      Madelyn Joseph, PTA, DPT, NCS 7/26/2022

## 2022-07-26 NOTE — PROGRESS NOTES
OT DAILY TREATMENT NOTE - [facility]     Patient Name: Diya Kat  Date:2022  : 1945  [x]  Patient  Verified  Payor: Beth Humphrey / Plan: [employer] / Product Type: Managed Care Medicare /    In time:1311  Out time:1355  Total Treatment Time (min): 44  Total Timed Codes (min): 44  1:1 Treatment Time ( [address] only): 40   Visit #: 11 of 16    Treatment Area: Muscle weakness (generalized) [M62.81]    SUBJECTIVE  Pain Level (0-10 scale): slight pain headache  Any medication changes, allergies to medications, adverse drug reactions, diagnosis change, or new procedure performed?: [x] No    [] Yes (see summary sheet for update)  Subjective functional status/changes:   [] No changes reported  Pt reports not feeling well today. He did not sleep well last night. He is monitoring BP at home. OBJECTIVE          25 min Therapeutic Exercise:  [] See flow sheet : Pt performed a variety of exercises including  strengthening, finger strengtheing with weighted clothes pins and putty. He practiced eye hand coordination with grooved pegboard. Practiced shoulder slow controlled flex/ext with 1lb weight seated x 3 reps over 10 count each up and down. Rationale: increase strength and improve coordination to improve the patients ability to use R hand    14 min Therapeutic Activity:  []  See flow sheet : Performed eye hand coordination tasks and sensory tasks including rice bucket, sensory brush. Rationale: improve coordination and increase proprioception  to improve the patients ability to use R hand           With   [x] TE   [] TA   [] neuro   [] other: Patient Education: [x] Review HEP    [] Progressed/Changed HEP based on:   [] positioning   [] body mechanics   [] transfers   [] heat/ice application    [] other:          Pain Level (0-10 scale) post treatment: none    ASSESSMENT/Changes in Function: Pt continues to work on strength and coordination.  His coordination in the fingers is slowly improving and directly impacted by continued loss of sensation in the fingers. He is unable in most instances to locate dice, marbles and coins in rice. Patient will continue to benefit from skilled OT services to modify and progress therapeutic interventions, address ROM deficits, address strength deficits, analyze and address soft tissue restrictions, analyze and cue movement patterns, and analyze and modify body mechanics/ergonomics to attain remaining goals. [x]  See Plan of Care  []  See progress note/recertification  []  See Discharge Summary         Status at last note/certification:Progress towards goals / Updated goals: continue  Short Term Goals: To be accomplished in 8 treatments:  1. Pt reports compliance w/HEP MET  2. Pt demonstrates 45lbs of R  strength. MET 6/9/33  3. Pt has improved sensation in the R thumb based on monofilament testing to allow him to  small objects 4 of 5 times. MET 7/7/22  4. Pt is able to perform 9 hold peg test using R hand in less than 1 min 30 seconds. MET 7/21/22  5. Pt is able to perform slow 10 count shoulder movements without jerking motion 3 of 4 attempts. MET     Long Term Goals: To be accomplished in 16 treatments:  1. Pt has 60lbs of R hand  needed to use tools in his shop. MET 7/21/22  2. Pt has fine motor pinch strength increased by 2lbs on R hand. Partially Met  3. Pt is able to use knife/fork to cut food w/o need for built up handles. MET 7/21/22  4. Pt is able to perform screw  tasks using the R hand safely for 1 continuous minute. MET and also using drill and impact . 5. Pt is able to use his right hand to perform 9 hold peg test in under one minute.  CONTINUE    PLAN  [x]  Upgrade activities as tolerated     [x]  Continue plan of care  []  Update interventions per flow sheet       []  Discharge due to:_  []  Other:_      Destiny Ham, OT 7/26/2022

## 2022-07-28 ENCOUNTER — APPOINTMENT (OUTPATIENT)
Dept: PHYSICAL THERAPY | Age: 77
End: 2022-07-28
Payer: MEDICARE

## 2022-08-02 ENCOUNTER — HOSPITAL ENCOUNTER (OUTPATIENT)
Dept: PHYSICAL THERAPY | Age: 77
Discharge: HOME | End: 2022-08-02
Payer: MEDICARE

## 2022-08-02 PROCEDURE — 97530 THERAPEUTIC ACTIVITIES: CPT

## 2022-08-02 PROCEDURE — 97112 NEUROMUSCULAR REEDUCATION: CPT

## 2022-08-02 PROCEDURE — 97110 THERAPEUTIC EXERCISES: CPT

## 2022-08-02 NOTE — PROGRESS NOTES
OT DAILY TREATMENT NOTE - [facility]     Patient Name: Vahid Huff  [id number]  : 1945  [x]  Patient  Verified  Payor: Mellissa Styles / Plan: [employer]  / Product Type: Managed Care Medicare /    In XQXC:2119  Out time:1458  Total Treatment Time (min): 46  Total Timed Codes (min): 46  1:1 Treatment Time ( only): 55   Visit #: 12     Treatment Area: Muscle weakness (generalized) [M62.81]    SUBJECTIVE  Pain Level (0-10 scale): Pt still having pain in R side of face, not constant now thinks it might be a bad tooth  Any medication changes, allergies to medications, adverse drug reactions, diagnosis change, or new procedure performed?: [x] No    [] Yes (see summary sheet for update)  Subjective functional status/changes:   [] No changes reported  Pt has been in the shop some, doing activities including checking oil, putting on mower deck, filling with gas using both hands. Used electric impact wrench with right hand w/o problems.       OBJECTIVE    38 min Therapeutic Exercise:  [] See flow sheet : Pt performed strength testing, sensory bucket activities, pencil activities and fine motor coordination with grooved pegboard   Rationale: increase strength, improve coordination, and increase proprioception to improve the patients ability to us R hand for tasks      8 min Manual Therapy:  deep tissue trigger point release and massage to the R shoulder superior to scapula due to stiffness/pain   Rationale: decrease pain and decrease trigger points to move R shoulder w/o discomfort            With   [x] TE   [] TA   [] neuro   [] other: Patient Education: [x] Review HEP    [] Progressed/Changed HEP based on:   [] positioning   [] body mechanics   [] transfers   [] heat/ice application    [] other:      Other Objective/Functional Measures:    Lateral pinch R 18.5  (was 16.5 L 19lbs)  Tripod pinch 14lbs ([id number] R and slipped off L 15lbs)  Tip pinch 15lsb (was 10lbs R and L hand 13lbs)  Pain Level (0-10 scale) post treatment: no change as pain mostly in R side of face    ASSESSMENT/Changes in Function: Pt still has on/off pain in face R side and now thinks it might be from a tooth and plans to see a dentist.  He has improved finger pinch strength but continues to struggle with coordination fine motor tasks. He remains unable to identify objects including large marble in rice bucket. He was able to correctly id a dice, marble,  when each object placed in his hand w/eyes closed but also incorrectly identified coin,  and large paperclip. Patient will continue to benefit from skilled OT services to modify and progress therapeutic interventions, address ROM deficits, address strength deficits, analyze and cue movement patterns, and analyze and modify body mechanics/ergonomics to attain remaining goals. [x]  See Plan of Care  []  See progress note/recertification  []  See Discharge Summary         Progress towards goals / Updated goals:  Short Term Goals: To be accomplished in 8 treatments:  1. Pt reports compliance w/HEP MET  2. Pt demonstrates 45lbs of R  strength. MET 6/9/33  3. Pt has improved sensation in the R thumb based on monofilament testing to allow him to  small objects 4 of 5 times. MET 7/7/22  4. Pt is able to perform 9 hold peg test using R hand in less than 1 min 30 seconds. MET 7/21/22  5. Pt is able to perform slow 10 count shoulder movements without jerking motion 3 of 4 attempts. MET     Long Term Goals: To be accomplished in 16 treatments:  1. Pt has 60lbs of R hand  needed to use tools in his shop. MET 7/21/22  2. Pt has fine motor pinch strength increased by 2lbs on R hand. MET 8/2/22  3. Pt is able to use knife/fork to cut food w/o need for built up handles. MET 7/21/22  4. Pt is able to perform screw  tasks using the R hand safely for 1 continuous minute. MET and also using drill and impact .   5. Pt is able to use his right hand to perform 9 hold peg test in under one minute.  CONTINUE       PLAN  [x]  Upgrade activities as tolerated     [x]  Continue plan of care  []  Update interventions per flow sheet       []  Discharge due to:_  []  Other:_      Ariadne Sandoval, OT 8/2/2022

## 2022-08-02 NOTE — PROGRESS NOTES
PT DAILY TREATMENT NOTE - [facility]     Patient Name: Yadi Lopez  [id number]  : 1945  [x]  Patient  Verified  Payor: Liliam Bustos / Plan: William Marie / Product Type: Managed Care Medicare /    In time:1400 Out time:1445  Total Treatment Time (min): 45  Total Timed Codes (min): 45  1:1 Treatment Time ( [address] only): 39   Visit #: 11 of 16    Treatment Area: Muscle weakness (generalized) [M62.81]    SUBJECTIVE  Pain Level (0-10 scale): Denies pain   Any medication changes, allergies to medications, adverse drug reactions, diagnosis change, or new procedure performed?: [x] No    [] Yes (see summary sheet for update)  Subjective functional status/changes:   [x] No changes reported  \"I've been out in my shop a lot more lately. Working on this leg and hand\"    OBJECTIVE    15 min Therapeutic Exercise:  [x] See flow sheet :   Rationale: increase strength, improve coordination, and improve balance to improve the patients ability to perform community level gait and balance tasks safely and with decreased fall risk. 15 min Therapeutic Activity:  [x]  See flow sheet :   Rationale: increase strength, improve coordination, and improve balance  to improve the patients ability to perform community level gait safely and with normalized mechanics. To progress gait fluency and balance strategies with gait to progress past use of walking stick/ cane. 15 min Neuromuscular Re-education:  [x]  See flow sheet :   Rationale: increase strength, improve balance, and increase proprioception  to improve the patients ability to perform fall recovery strategies safely and independently, to improve fall prevention and safety at community level.            With   [] TE   [] TA   [] neuro   [] other: Patient Education: [x] Review HEP    [] Progressed/Changed HEP based on:   [] positioning   [] body mechanics   [] transfers   [] heat/ice application    [] other:           Pain Level (0-10 scale) post treatment: denies pain     ASSESSMENT/Changes in Function: Mr. Sol Pereyra ambulated in/out of clinic mod I with cane. Noted continued improvements with functional balance and gait. Progressed activities for dynamic balance including surface compliance, coordination of R LE to target/ clearing targets. Fall recovery/ floor <> stand transfer practiced towards end of session for safety in fall recovery at community level. Pt with good performance of tasks and progression of exercises well. He continues to benefit from PT services and is working towards goals. Continue per POC. Patient will continue to benefit from skilled PT services to modify and progress therapeutic interventions, address functional mobility deficits, address strength deficits, analyze and cue movement patterns, assess and modify postural abnormalities, and instruct in home and community integration to attain remaining goals. []  See Plan of Care  []  See progress note/recertification  []  See Discharge Summary         Progress towards goals / Updated goals: Working towards goals.      PLAN  [x]  Upgrade activities as tolerated     [x]  Continue plan of care  [x]  Update interventions per flow sheet       []  Discharge due to:_  []  Other:_      Agnes Frey, PT, DPT, NCS 8/2/2022

## 2022-08-09 ENCOUNTER — HOSPITAL ENCOUNTER (OUTPATIENT)
Dept: PHYSICAL THERAPY | Age: 77
Discharge: HOME | End: 2022-08-09
Payer: MEDICARE

## 2022-08-09 PROCEDURE — 97110 THERAPEUTIC EXERCISES: CPT

## 2022-08-09 PROCEDURE — 97112 NEUROMUSCULAR REEDUCATION: CPT

## 2022-08-09 PROCEDURE — 97530 THERAPEUTIC ACTIVITIES: CPT

## 2022-08-09 NOTE — PROGRESS NOTES
OT DAILY TREATMENT NOTE - [facility]     Patient Name: Cherri Harrison  Date:2022  : 1945  [x]  Patient  Verified  Payor: Wale Hong / Plan: Arik Ruiz / Product Type: Managed Care Medicare /    In time:1448  Out time:1533  Total Treatment Time (min): 45  Total Timed Codes (min): 45  1:1 Treatment Time ([address] only): 39   Visit #: 13 of 16    Treatment Area: Muscle weakness (generalized) [M62.81]    SUBJECTIVE  Pain Level (0-10 scale): 0/10  Any medication changes, allergies to medications, adverse drug reactions, diagnosis change, or new procedure performed?: [x] No    [] Yes (see summary sheet for update)  Subjective functional status/changes:   [] No changes reported  Pt reports no change in status since last visit. 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 OBJECTIVE          15 min Therapeutic Exercise:  [] See flow sheet : performed putty streengthening, reviewed HEP; remeasured ; discussed motorcycle set-up   Rationale: increase strength to improve the patients ability to use R hand    30 min Therapeutic Activity:  []  See flow sheet : played fine motor game, practiced picking up small items and putting small objects together via threads   Rationale: improve coordination  to improve the patients ability to use R hand with tasks functionally            With   [x] TE   [] TA   [] neuro   [] other: Patient Education: [x] Review HEP    [] Progressed/Changed HEP based on:   [] positioning   [] body mechanics   [] transfers   [] heat/ice application    [] other:      Other Objective/Functional Measures:  R 56lbs (down 10lbs since last week)     Pain Level (0-10 scale) post treatment: 0/10    ASSESSMENT/Changes in Function: Pt continues to have numbness in the R hand but feels might have some improvement in fingertips. He still has difficulty picking up small manipulatives when he has to then place them on something. He is easily able to  small objects and drop in a container. He is disappointed in the  strength drop. Still gets dull throbbing pain in the R side of face around eye. Sees MD on Thursday. Patient will continue to benefit from skilled O T services to modify and progress therapeutic interventions, address ROM deficits, address strength deficits, analyze and address soft tissue restrictions, analyze and cue movement patterns, and analyze and modify body mechanics/ergonomics to attain remaining goals. [x]  See Plan of Care  []  See progress note/recertification  []  See Discharge Summary         Progress towards goals / Updated goals:  Progress towards goals / Updated goals:  Short Term Goals: To be accomplished in 8 treatments:  1. Pt reports compliance w/HEP MET  2. Pt demonstrates 45lbs of R  strength. MET 6/9/33  3. Pt has improved sensation in the R thumb based on monofilament testing to allow him to  small objects 4 of 5 times. MET 7/7/22  4. Pt is able to perform 9 hold peg test using R hand in less than 1 min 30 seconds. MET 7/21/22  5. Pt is able to perform slow 10 count shoulder movements without jerking motion 3 of 4 attempts. MET     Long Term Goals: To be accomplished in 16 treatments:  1. Pt has 60lbs of R hand  needed to use tools in his shop. MET 7/21/22  2. Pt has fine motor pinch strength increased by 2lbs on R hand. MET 8/2/22  3. Pt is able to use knife/fork to cut food w/o need for built up handles. MET 7/21/22  4. Pt is able to perform screw  tasks using the R hand safely for 1 continuous minute. MET and also using drill and impact . 5. Pt is able to use his right hand to perform 9 hold peg test in under one minute.  CONTINUE       PLAN  [x]  Upgrade activities as tolerated     [x]  Continue plan of care  []  Update interventions per flow sheet       []  Discharge due to:_  []  Other:_      Rachel Chavez OT 8/9/2022

## 2022-08-09 NOTE — PROGRESS NOTES
PT DAILY TREATMENT NOTE - [facility]     Patient Name: John Martin  Date:2022  : 1945  [x]  Patient  Verified  Payor: Blanca Sanchez / Plan: Vinita Will / Product Type: Managed Care Medicare /    In time:1535 Out time:162  Total Treatment Time (min): 45  Total Timed Codes (min): 45  1:1 Treatment Time ( [address] only): 39   Visit #: 12  16    Treatment Area: Muscle weakness (generalized) [M62.81]    SUBJECTIVE  Pain Level (0-10 scale): Denies pain   Any medication changes, allergies to medications, adverse drug reactions, diagnosis change, or new procedure performed?: [x] No    [] Yes (see summary sheet for update)  Subjective functional status/changes:   [x] No changes reported  \"I've come a long way. I really want to make my balance better. \"    OBJECTIVE    15 min Therapeutic Exercise:  [x] See flow sheet :   Rationale: increase strength, improve coordination, and improve balance to improve the patients ability to perform community level gait and balance tasks safely and with decreased fall risk. 30 min Neuromuscular Re-education:  [x]  See flow sheet :   Rationale: increase strength, improve balance, and increase proprioception  to improve the patients ability to perform fall recovery strategies safely and independently, to improve fall prevention and safety at community level. With   [x] TE   [] TA   [] neuro   [] other: Patient Education: [x] Review HEP    [x] Progressed/Changed HEP based on:   [] positioning   [] body mechanics   [] transfers   [] heat/ice application    [] other:           Pain Level (0-10 scale) post treatment: denies pain     ASSESSMENT/Changes in Function: Mr. Srinivasa Duong continues to make improvement with gait and balance. Able to incorporate BOSU (round side up). Pt reports reasonable challenge and is excited to resume on follow up.     Patient will continue to benefit from skilled PT services to modify and progress therapeutic interventions, address functional mobility deficits, address strength deficits, analyze and cue movement patterns, assess and modify postural abnormalities, and instruct in home and community integration to attain remaining goals. [x]  See Plan of Care  []  See progress note/recertification  []  See Discharge Summary         Progress towards goals / Updated goals: Working towards goals.      PLAN  [x]  Upgrade activities as tolerated     [x]  Continue plan of care  [x]  Update interventions per flow sheet       []  Discharge due to:_  []  Other:_      Errlinda Andrea PTA,  8/9/2022

## 2022-08-16 ENCOUNTER — HOSPITAL ENCOUNTER (OUTPATIENT)
Dept: PHYSICAL THERAPY | Age: 77
Discharge: HOME | End: 2022-08-16
Payer: MEDICARE

## 2022-08-16 PROCEDURE — 97110 THERAPEUTIC EXERCISES: CPT

## 2022-08-16 PROCEDURE — 97530 THERAPEUTIC ACTIVITIES: CPT

## 2022-08-16 PROCEDURE — 97112 NEUROMUSCULAR REEDUCATION: CPT

## 2022-08-16 NOTE — PROGRESS NOTES
OT DAILY TREATMENT NOTE - [facility]     Patient Name: Olivia Stratton  Date:2022  : 1945  [x]  Patient  Verified  Payor: Franny Trinidad / Plan: Ana Covarrubias / Product Type: Managed Care Medicare /    In time:1358  Out time:1443  Total Treatment Time (min): 45  Total Timed Codes (min): 45  1:1 Treatment Time ( [address] only): 39   Visit #: 14 of 16    Treatment Area: Muscle weakness (generalized) [M62.81]    SUBJECTIVE  Pain Level (0-10 scale): 0/10  Any medication changes, allergies to medications, adverse drug reactions, diagnosis change, or new procedure performed?: [x] No    [] Yes (see summary sheet for update)  Subjective functional status/changes:   [] No changes reported  Pt reports using R hand to button shirt without thinking. No change in sensation. Using hand more on his own. OBJECTIVE      10 min Therapeutic Exercise:  [] See flow sheet : remeasure for progress note; discussed strengthening exercises he can do at home; performed exam and applied marilee pain relieving cream to the L upper trap area due to complaint of pain with knot   Rationale: increase strength, improve coordination, and decrease discomfort  to improve the patients ability to use RUE    35 min Therapeutic Activity:  []  See flow sheet : ball bounce, bounce catch, toss and catch with OTR then against wall performed with various foot positions.   Practiced simulated getting on/off motorcycle standing on L side and swinging R leg over and back without losing balance x 5; practiced fine motor coord   Rationale: increase strength and improve coordination  to improve the patients ability to use RUE and ride motorcycle again                 With   [x] TE   [] TA   [] neuro   [] other: Patient Education: [x] Review HEP    [x] Progressed/Changed HEP based on: has light weight at home, recommended 10 reps each direction forward shoulder circles and lateral/abducted shoulder circles  [] positioning   [] body mechanics [] transfers   [] heat/ice application    [] other:      Other Objective/Functional Measures:  R 65lbs (was 66 and initial was 34lbs);  L 70lbs    9 hole peg test  1:24 sec (was 2: 06 sec initially R 33 sec L)     Pain Level (0-10 scale) post treatment: 0/10    ASSESSMENT/Changes in Function: Pt has not made significant progress in strength or coordination timed test this month. He is continuing to improve at home using his R hand for functional tasks. Vinh So he has started practice riding bicycle. Has been using the R hand in his workshop to open clamps, spray W-D 40 and for buttoning in the past few weeks. Sensation reportedly has had no change with persistent numbness in R hand to mid forearm, although initially he was numb to above the elbow/    Patient will continue to benefit from skilled OT services to modify and progress therapeutic interventions, address ROM deficits, address strength deficits, analyze and address soft tissue restrictions, analyze and cue movement patterns, and analyze and modify body mechanics/ergonomics to attain remaining goals. [x]  See Plan of Care  [x]  See progress note/recertification  []  See Discharge Summary         Progress towards goals / Updated goals:  Short Term Goals: To be accomplished in 8 treatments:  1. Pt reports compliance w/HEP MET  2. Pt demonstrates 45lbs of R  strength. MET 6/9/33  3. Pt has improved sensation in the R thumb based on monofilament testing to allow him to  small objects 4 of 5 times. MET 7/7/22  4. Pt is able to perform 9 hold peg test using R hand in less than 1 min 30 seconds. MET 7/21/22  5. Pt is able to perform slow 10 count shoulder movements without jerking motion 3 of 4 attempts. MET     Long Term Goals: To be accomplished in 16 treatments:  1. Pt has 60lbs of R hand  needed to use tools in his shop. MET 7/21/22  2. Pt has fine motor pinch strength increased by 2lbs on R hand. MET 8/2/22  3.  Pt is able to use knife/fork to cut food w/o need for built up handles. MET 7/21/22  4. Pt is able to perform screw  tasks using the R hand safely for 1 continuous minute. MET and also using drill and impact . 5. Pt is able to use his right hand to perform 9 hold peg test in under one minute.  CONTINUE       PLAN  [x]  Upgrade activities as tolerated     [x]  Continue plan of care  []  Update interventions per flow sheet       []  Discharge due to:_  []  Other:_      Holly Lu OT 8/16/2022

## 2022-08-16 NOTE — PROGRESS NOTES
[facility]  Dionicio Carlson, [address] [place] [address] Road  Phone: [phone] Fax: [phone]    Occupational Therapy Progress Note    Name: Charlotte Abreu   : 1945   MD: Ivy Kelley MD       Treatment Diagnosis: Muscle weakness (generalized) [M62.81]  Start of Care: 22    Visits from Start of Care: 14  Missed Visits: 2    Summary of Care:  Pt has been seen 4 times since last visit. Worked on sensation, strength, fine motor control, functional balance activities, trigger point pain in the L superior scapular area. Assessment / Recommendations:    R 65lbs (was 66 and initial was 34lbs);  L 70lbs     9 hole peg test  1:24 sec (was 2: 06 sec initially R 33 sec L)      Pt reports he is continuing to spontaneously use his right hand for more activities including buttoning, manipulating spray can and opening C-clamps. He still reports numbness in the R hand from the forearm to fingertips which impacts fine motor control. We have one more visit on this certification. He will be discharged to a [facility] after next visit. Progress towards goals / Updated goals:  Short Term Goals: To be accomplished in 8 treatments:  1. Pt reports compliance w/HEP MET  2. Pt demonstrates 45lbs of R  strength. MET 33  3. Pt has improved sensation in the R thumb based on monofilament testing to allow him to  small objects 4 of 5 times. MET 22  4. Pt is able to perform 9 hold peg test using R hand in less than 1 min 30 seconds. MET 22  5. Pt is able to perform slow 10 count shoulder movements without jerking motion 3 of 4 attempts. MET     Long Term Goals: To be accomplished in 16 treatments:  1. Pt has 60lbs of R hand  needed to use tools in his shop. MET 22  2. Pt has fine motor pinch strength increased by 2lbs on R hand. MET 22  3. Pt is able to use knife/fork to cut food w/o need for built up handles. MET 7/21/22  4. Pt is able to perform screw  tasks using the R hand safely for 1 continuous minute. MET and also using drill and impact . 5. Pt is able to use his right hand to perform 9 hold peg test in under one minute.  CONTINUE       Other: one more visit and then will d/c to Fwd: Power, OT 8/16/2022

## 2022-08-23 ENCOUNTER — HOSPITAL ENCOUNTER (OUTPATIENT)
Dept: PHYSICAL THERAPY | Age: 77
Discharge: HOME | End: 2022-08-23
Payer: MEDICARE

## 2022-08-23 PROCEDURE — 97110 THERAPEUTIC EXERCISES: CPT

## 2022-08-23 NOTE — PROGRESS NOTES
OT DAILY TREATMENT NOTE - [facility]     Patient Name: Bebeto Lam  Date:2022  : 1945  [x]  Patient  Verified  Payor: Aleene Councilman / Plan: Karly Dubose / Product Type: Managed Care Medicare /    In time:1405  Out time:1450  Total Treatment Time (min): 45  Total Timed Codes (min): 45  1:1 Treatment Time ([facility] only): 45   Visit #: 15 of 16    Treatment Area: Muscle weakness (generalized) [M62.81]    SUBJECTIVE  Pain Level (0-10 scale): intermittent from R eye to ear, worse when he lays down  Any medication changes, allergies to medications, adverse drug reactions, diagnosis change, or new procedure performed?: [x] No    [] Yes (see summary sheet for update)  Subjective functional status/changes:   [] No changes reported  Pt continues to have intermittent eye/nerve pain from R eye to R ear which came on today after ball activities. Usually passes quickly    OBJECTIVE      45 min Therapeutic Exercise:  [] See flow sheet : remeasure 9 hole peg test; ball skills in standing, yoga pose stretches on mat; atttempted warrior pose but too challenging for balance; discussed benefit of Pal Chi; shoulder pulleys x 3 mins for flex/abd; discussed how to access OTR for R shoulder pain and spoke to wife about HEP   Rationale: increase strength, improve coordination, and ed  to improve the patients ability to use RUE              With   [x] TE   [] TA   [] neuro   [] other: Patient Education: [x] Review HEP    [] Progressed/Changed HEP based on:   [] positioning   [] body mechanics   [] transfers   [] heat/ice application    [] other:      Other Objective/Functional Measures:   9 hole peg test now 52 seconds on R (initial 2:06)       Pain Level (0-10 scale) post treatment: no pain reported at end of session    ASSESSMENT/Changes in Function: pt noted to have improved balance/gait after stretches on the mat.  Discussed doing in the morning before getting out of bed for child's pose, cat/cow and trunk rotation r and L. Pt will continue to work on fine motor activities via hobbies. Discussed using light weights for shoulder circles in abd and flexion if he choses and should be painfree. []  See Plan of Care  []  See progress note/recertification  [x]  See Discharge Summary           Progress towards goals / Updated goals:  Short Term Goals: To be accomplished in 8 treatments:  1. Pt reports compliance w/HEP MET  2. Pt demonstrates 45lbs of R  strength. MET 6/9/33  3. Pt has improved sensation in the R thumb based on monofilament testing to allow him to  small objects 4 of 5 times. MET 7/7/22  4. Pt is able to perform 9 hold peg test using R hand in less than 1 min 30 seconds. MET 7/21/22  5. Pt is able to perform slow 10 count shoulder movements without jerking motion 3 of 4 attempts. MET     Long Term Goals: To be accomplished in 16 treatments:  1. Pt has 60lbs of R hand  needed to use tools in his shop. MET 7/21/22  2. Pt has fine motor pinch strength increased by 2lbs on R hand. MET 8/2/22  3. Pt is able to use knife/fork to cut food w/o need for built up handles. MET 7/21/22  4. Pt is able to perform screw  tasks using the R hand safely for 1 continuous minute. MET and also using drill and impact . 5. Pt is able to use his right hand to perform 9 hold peg test in under one minute.  MET at 52 sec 8/23/22       PLAN  []  Upgrade activities as tolerated     []  Continue plan of care  []  Update interventions per flow sheet       [x]  Discharge due to:can d/c to HEP now  []  Other:_      Florencio Moore, OT 8/23/2022

## 2022-08-23 NOTE — PROGRESS NOTES
[facility]   [address]  Phone: [phone]      Fax:  [phone]    Progress Note    Name: Caitlin Mclean   : 1945   MD: Samy Villavicencio MD       Treatment Diagnosis: Muscle weakness (generalized) [M62.81]  Start of Care: 22    Visits from Start of Care: 11      Summary of Care:Patient reports he has been working in his shop lately using his leg and hand more. He is ambulating in and out of the clinic with a cane and modified independence. He continues to progress towards goals and is improving with his dynamic balance activities. Working on fall recovery and floor to stand transfers- patient demonstrated he is able to safely transfer from the floor after practice and education. Assessment / Recommendations: Continue skilled PT 1-2 times a week up to 16 visits. Short Term Goals: To be accomplished in 8 visits:  1. Pt will demonstrate gait speed >0.5m/s with AD for improved gait speed and decreased fall risk. 2. Pt will perform HEP with Min A from family consistently for improved independence in therex/care plan. 3. Pt will report no falls this plan of care for improved safety and decreased impulsivity with mobility. Long Term Goals: To be accomplished in 24 visits:  1. Pt will demonstrate gait speed >1.0m/s with AD for improved gait speed and decreased fall risk. 2. Pt will perform HEP independently for improved transition to community level exercise program.  3. Pt will tolerate Gomez Balance test without AD for community level fall risk prediction. Michelle Nj, PT 2022 3:59 PM    ________________________________________________________________________  NOTE TO PHYSICIAN:  Please complete the following and fax to: [facility]: Fax: [phone]. Leana Ann Retain this original for your records.   If you are unable to process this request in 24 hours, please contact our office.        ____ I have read the above report and request that my patient continue therapy with the following changes/special instructions:  ____ I have read the above report and request that my patient be discharged from therapy    Physician's Signature:_________________ Date:___________Time:__________

## 2022-08-23 NOTE — PROGRESS NOTES
[facility]  [address]  Phone: [phone] Fax: [phone]      Discharge Summary 2-15    Patient name: Vahid Huff  : 1945  Provider#: [id number]  Referral source: Demetris Brice MD      Medical/Treatment Diagnosis: Muscle weakness (generalized) [M62.81]     Prior Hospitalization: see medical history     Comorbidities: See Plan of Care  Prior Level of Function: See Plan of Care  Medications: Verified on Patient Summary List    Start of Care: 22      Onset Date:22   Visits from Start of Care: 15     Missed Visits: 2  Reporting Period : 22 to 22    Assessment/Summary of care: Pt has received education, manual therapy, thera ex and there activities to improve his generalized weakness, sensation impairment and fine motor coordination deficits in the RUE. He has returned to working in his shop as a  and uses L hand for heavy work or power tool use. He is independent in self care tasks and has started spontaneously using R hand again for tasks. He has new R shoulder pain in past week or so and will seek a new script from [facility] for treatment if it persists. Progress note sent last week. Today his 9 hole peg test improved to 52 seconds on the R, meeting final goal set at start of care. He has HEP to continue to improve at home. Short Term Goals: To be accomplished in 8 treatments:  1. Pt reports compliance w/HEP MET  2. Pt demonstrates 45lbs of R  strength. MET 33  3. Pt has improved sensation in the R thumb based on monofilament testing to allow him to  small objects 4 of 5 times. MET 22  4. Pt is able to perform 9 hold peg test using R hand in less than 1 min 30 seconds. MET 22  5. Pt is able to perform slow 10 count shoulder movements without jerking motion 3 of 4 attempts. MET     Long Term Goals: To be accomplished in 16 treatments:  1. Pt has 60lbs of R hand  needed to use tools in his shop. MET 7/21/22  2. Pt has fine motor pinch strength increased by 2lbs on R hand. MET 8/2/22  3. Pt is able to use knife/fork to cut food w/o need for built up handles. MET 7/21/22  4. Pt is able to perform screw  tasks using the R hand safely for 1 continuous minute. MET and also using drill and impact . 5. Pt is able to use his right hand to perform 9 hold peg test in under one minute. MET at 52 sec 8/23/22       The severity rating is based on clinical judgment and the Other measurements taken  score.     RECOMMENDATIONS:  [x]Discontinue therapy: [x]Patient has reached or is progressing toward set goals     []Patient is non-compliant or has abdicated     []Due to lack of appreciable progress towards set goals     []Other  Sonal Wagner OT 8/23/2022

## 2022-08-23 NOTE — PROGRESS NOTES
[facility]rapy Discharge Summary    Patient name: Venkatesh Arreola Start of Care: 22   Referral source: Maliha Luevano MD : 1945   Medical/Treatment Diagnosis: Muscle weakness (generalized) [M62.81] Onset Date:2022     Prior Hospitalization: see medical history Provider#: Medications: Verified on Patient Summary List    Comorbidities: refer to eval  Prior Level of Function:refer to eval  Visits from Start of Care: 14     Reporting Period : 22 to 22    Summary of Care: Patient reports he feels ready for discharge. He is performing yardwork such as using weed machine, mowing the lawn. He is performing HEP. He is ambulating independently with and without a cane. Gomez balance score is 50/56 which indicates low risk of falls. He is highly motivated to return to prior activities and works hard at improving his mobility. Patient has met/is progressing towards goals. Short Term Goals: To be accomplished in 8 visits:  1. Pt will demonstrate gait speed >0.5m/s with AD for improved gait speed and decreased fall risk. MET  2. Pt will perform HEP with Min A from family consistently for improved independence in therex/care plan. MET  3. Pt will report no falls this plan of care for improved safety and decreased impulsivity with mobility. MET  Long Term Goals: To be accomplished in 24 visits:  1. Pt will demonstrate gait speed >1.0m/s with AD for improved gait speed and decreased fall risk. MET  2. Pt will perform HEP independently for improved transition to community level exercise program.MET  3. Pt will tolerate Gomez Balance test without AD for community level fall risk prediction. MET    The severity rating is based on clinical judgment and the FOTO score.     ASSESSMENT/RECOMMENDATIONS:  [x]Discontinue therapy: [x]Patient has reached or is progressing toward set goals      []Patient is non-compliant or has abdicated      []Due to lack of appreciable progress towards set goals    Danae Graf PT 8/23/2022

## 2022-08-23 NOTE — PROGRESS NOTES
PT DAILY TREATMENT NOTE - [facility]     Patient Name: John Martin  Date:2022  : 1945  [x]  Patient  Verified  Payor: Blanca Sanchez / Plan: Vinita Will / Product Type: Managed Care Medicare /    In time:245  Out time:330  Total Treatment Time (min): 45  Total Timed Codes (min): 45  1:1 Treatment Time ( [address] only): 39   Visit #: 14 of 14    Treatment Area: Muscle weakness (generalized) [M62.81]    SUBJECTIVE  Pain Level (0-10 scale): 0  Any medication changes, allergies to medications, adverse drug reactions, diagnosis change, or new procedure performed?: [x] No    [] Yes (see summary sheet for update)  Subjective functional status/changes:   [] No changes reported  Patient reports he feels ready for D/C. He is gardening, using weed eater, mowing lawn. Walks with and without cane at home, also using a walking stick if needed    OBJECTIVE    45 min Therapeutic Exercise:  [x] See flow sheet :reviewed HEP   Rationale: increase ROM, increase strength, improve coordination, improve balance, and increase proprioception to improve the patients ability to perform outside yard work safely              With   [] TE   [] TA   [] neuro   [] other: Patient Education: [x] Review HEP    [] Progressed/Changed HEP based on:   [] positioning   [] body mechanics   [] transfers   [] heat/ice application    [] other:         Pain Level (0-10 scale) post treatment: 0    ASSESSMENT/Changes in Function: patient ambulating independently with and without a st cane. Gomez balance score is 50/56 which indicates decreased risk of falls.   Patient has met/progressing towards goals       []  See Plan of Care  []  See progress note/recertification  [x]  See Discharge Summary             PLAN  []  Upgrade activities as tolerated     []  Continue plan of care  []  Update interventions per flow sheet       [x]  Discharge due to:_goals met, patient requesting  []  Other:_      Davion Friedman, PT 2022

## 2022-10-20 ENCOUNTER — OFFICE VISIT (OUTPATIENT)
Dept: FAMILY MEDICINE CLINIC | Age: 77
End: 2022-10-20
Payer: MEDICARE

## 2022-10-20 VITALS
WEIGHT: 177 LBS | TEMPERATURE: 98.1 F | HEIGHT: 68 IN | HEART RATE: 77 BPM | DIASTOLIC BLOOD PRESSURE: 81 MMHG | SYSTOLIC BLOOD PRESSURE: 127 MMHG | BODY MASS INDEX: 26.83 KG/M2 | OXYGEN SATURATION: 98 % | RESPIRATION RATE: 20 BRPM

## 2022-10-20 DIAGNOSIS — E11.9 CONTROLLED TYPE 2 DIABETES MELLITUS WITHOUT COMPLICATION, WITHOUT LONG-TERM CURRENT USE OF INSULIN (HCC): ICD-10-CM

## 2022-10-20 DIAGNOSIS — N40.1 BENIGN PROSTATIC HYPERPLASIA WITH URINARY FREQUENCY: ICD-10-CM

## 2022-10-20 DIAGNOSIS — I73.9 PERIPHERAL VASCULAR DISEASE (HCC): ICD-10-CM

## 2022-10-20 DIAGNOSIS — E11.65 POORLY CONTROLLED TYPE 2 DIABETES MELLITUS (HCC): ICD-10-CM

## 2022-10-20 DIAGNOSIS — B35.1 ONYCHOMYCOSIS: ICD-10-CM

## 2022-10-20 DIAGNOSIS — R35.0 BENIGN PROSTATIC HYPERPLASIA WITH URINARY FREQUENCY: ICD-10-CM

## 2022-10-20 DIAGNOSIS — E78.00 HYPERCHOLESTEROLEMIA: ICD-10-CM

## 2022-10-20 DIAGNOSIS — B35.1 TINEA UNGUIUM: ICD-10-CM

## 2022-10-20 DIAGNOSIS — I10 ESSENTIAL HYPERTENSION: Primary | ICD-10-CM

## 2022-10-20 PROBLEM — Z98.890 HISTORY OF CAROTID ENDARTERECTOMY: Status: RESOLVED | Noted: 2019-07-18 | Resolved: 2022-10-20

## 2022-10-20 PROBLEM — Z86.73 HISTORY OF CVA (CEREBROVASCULAR ACCIDENT): Status: RESOLVED | Noted: 2022-04-08 | Resolved: 2022-10-20

## 2022-10-20 PROBLEM — Z86.73 HISTORY OF CVA (CEREBROVASCULAR ACCIDENT): Chronic | Status: RESOLVED | Noted: 2022-04-08 | Resolved: 2022-10-20

## 2022-10-20 PROCEDURE — 99214 OFFICE O/P EST MOD 30 MIN: CPT | Performed by: FAMILY MEDICINE

## 2022-10-20 PROCEDURE — 36415 COLL VENOUS BLD VENIPUNCTURE: CPT | Performed by: FAMILY MEDICINE

## 2022-10-20 RX ORDER — AMLODIPINE BESYLATE 10 MG/1
10 TABLET ORAL DAILY
Qty: 90 TABLET | Refills: 1 | Status: SHIPPED | OUTPATIENT
Start: 2022-10-20

## 2022-10-20 RX ORDER — ATORVASTATIN CALCIUM 40 MG/1
40 TABLET, FILM COATED ORAL DAILY
Qty: 90 TABLET | Refills: 1 | Status: SHIPPED | OUTPATIENT
Start: 2022-10-20

## 2022-10-20 RX ORDER — KETOCONAZOLE 20 MG/G
CREAM TOPICAL 2 TIMES DAILY
Qty: 60 G | Refills: 1 | Status: SHIPPED | OUTPATIENT
Start: 2022-10-20

## 2022-10-20 RX ORDER — METFORMIN HYDROCHLORIDE 500 MG/1
TABLET, EXTENDED RELEASE ORAL
Qty: 180 TABLET | Refills: 1 | Status: SHIPPED | OUTPATIENT
Start: 2022-10-20

## 2022-10-20 NOTE — PROGRESS NOTES
1. \"Have you been to the ER, urgent care clinic since your last visit? Hospitalized since your last visit? \" No    2. \"Have you seen or consulted any other health care providers outside of the [address] [place] [address] since your last visit? \" No     3. For patients aged 39-70: Has the patient had a colonoscopy / FIT/ Cologuard? No     If the patient is female:    4. For patients aged 41-77: Has the patient had a mammogram within the past 2 years? NA - based on age    11. For patients aged 21-65: Has the patient had a pap smear?  NA - based on age

## 2022-10-20 NOTE — PROGRESS NOTES
Ad Jama is a 69 y.o. male who presents with the following:  Chief Complaint   Patient presents with    Diabetes    Hypertension    Cholesterol Problem    Benign Prostatic Hypertrophy       Patient comes in after having a right sided CVA this past summer and states that he is still having some headache on that side of his head but his vision is normal and his speech is normal and is having no weakness in his arms or legs. Patient was told to come in to follow-up here as his diabetes has been poorly controlled but his last A1c was in April and was 7.8. The patient has had no polyuria no polydipsia no abnormal weight loss and is currently on metformin 500 mg twice a day with meals. Patient would like to have his medication sent to [facility].  He needs refills on amlodipine metformin ketoconazole and atorvastatin. Patient uses to ketoconazole for his chronic onychia. Patient states that he is having no problem with muscle pain or weakness on the atorvastatin and is having no chest pain shortness of breath nor edema on his amlodipine. Patient does have BPH with urinary frequency and currently is on no medications for this but he states he is tolerating it at this point in time but if he gets worse he would let me know. No Known Allergies    Current Outpatient Medications   Medication Sig    amLODIPine (NORVASC) 10 mg tablet Take 1 Tablet by mouth daily. Take 1 Tab by mouth daily. atorvastatin (LIPITOR) 40 mg tablet Take 1 Tablet by mouth daily. ketoconazole (NIZORAL) 2 % topical cream Apply  to affected area two (2) times a day. metFORMIN ER (GLUCOPHAGE XR) 500 mg tablet 1 twice daily with meals    glucose blood VI test strips (ASCENSIA AUTODISC VI, ONE TOUCH ULTRA TEST VI) strip Check blood sugar every morning and every evening.  DX E11.0    gabapentin (NEURONTIN) 300 mg capsule TAKE 1 CAPSULE 3 TIMES A DAY    Senna Laxative 8.6 mg tablet TAKE 1 TABLET TWICE A DAY AS NEEDED FOR CONSTIPATION    aspirin delayed-release 81 mg tablet Take 1 Tab by mouth daily. Blood-Glucose Meter monitoring kit Use daily as directed to monitor glucose     No current facility-administered medications for this visit. 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    HTN (hypertension)     Positive FIT (fecal immunochemical test) 10/7/2019    Stroke [facility])        Past Surgical History:   Procedure Laterality Date    HX CAROTID ENDARTERECTOMY Right 06/2019    HX CAROTID ENDARTERECTOMY Left 11/05/2019       Family History   Problem Relation Age of Onset    Diabetes Father        Social History     Socioeconomic History    Marital status:    Tobacco Use    Smoking status: Former    Smokeless tobacco: Never   Substance and Sexual Activity    Alcohol use: Yes     Alcohol/week: 3.0 standard drinks     Types: 3 Cans of beer per week    Drug use: Never    Sexual activity: Yes       Review of Systems   Constitutional:  Negative for chills, fever, malaise/fatigue and weight loss. HENT:  Negative for congestion, hearing loss, sore throat and tinnitus. Eyes:  Negative for blurred vision, pain and discharge. Respiratory:  Negative for cough, shortness of breath and wheezing. Cardiovascular:  Negative for chest pain, palpitations, orthopnea, claudication and leg swelling. Gastrointestinal:  Negative for abdominal pain, constipation and heartburn. Genitourinary:  Negative for dysuria, frequency and urgency. Musculoskeletal:  Negative for falls, joint pain and myalgias. Skin:  Negative for itching and rash. Neurological:  Positive for headaches (Patient states that his headache is getting better but I told him that should it start to get worse then I would like to pursue further testing.). Negative for dizziness, tingling and tremors.    Endo/Heme/Allergies: Negative for environmental allergies and polydipsia. Psychiatric/Behavioral:  Negative for depression and substance abuse. The patient is not nervous/anxious. Visit Vitals  /81 (BP 1 Location: Left arm)   Pulse 77   Temp 98.1 °F (36.7 °C)   Resp 20   Ht 5' 8\" (1.727 m)   Wt 177 lb (80.3 kg)   SpO2 98%   BMI 26.91 kg/m²     Physical Exam  Vitals reviewed. Constitutional:       General: He is not in acute distress. Appearance: Normal appearance. He is not ill-appearing. HENT:      Head: Normocephalic and atraumatic. Right Ear: Tympanic membrane, ear canal and external ear normal.      Left Ear: Tympanic membrane, ear canal and external ear normal.      Nose: Nose normal. No congestion or rhinorrhea. Mouth/Throat:      Mouth: Mucous membranes are moist.      Pharynx: No oropharyngeal exudate or posterior oropharyngeal erythema. Eyes:      Extraocular Movements: Extraocular movements intact. Conjunctiva/sclera: Conjunctivae normal.      Pupils: Pupils are equal, round, and reactive to light. Comments: Pupil iris and anterior chamber normal.   Neck:      Vascular: No carotid bruit. Trachea: No tracheal deviation. Cardiovascular:      Rate and Rhythm: Normal rate and regular rhythm. Pulses: Normal pulses. Heart sounds: Normal heart sounds. No murmur heard. No friction rub. No gallop. Pulmonary:      Effort: Pulmonary effort is normal. No respiratory distress. Breath sounds: Normal breath sounds. No wheezing, rhonchi or rales. Chest:      Chest wall: No tenderness. Abdominal:      General: Bowel sounds are normal. There is no distension. Palpations: Abdomen is soft. There is no mass. Tenderness: There is no abdominal tenderness. There is no guarding or rebound. Hernia: No hernia is present. Musculoskeletal:         General: No tenderness. Normal range of motion. Cervical back: Normal range of motion and neck supple.       Right lower leg: No edema. Left lower leg: No edema. Lymphadenopathy:      Cervical: No cervical adenopathy. Skin:     General: Skin is warm and dry. Findings: No erythema or rash. Neurological:      General: No focal deficit present. Mental Status: He is alert and oriented to person, place, and time. Cranial Nerves: No cranial nerve deficit. Motor: No abnormal muscle tone. Deep Tendon Reflexes: Reflexes are normal and symmetric. Reflexes normal.      Comments: Cranial nerves II through XII intact sensory and motor. Deep tendon reflexes in the biceps triceps knee and ankle are normal and bilaterally symmetrical.   Psychiatric:         Mood and Affect: Mood normal.         Behavior: Behavior normal.         Judgment: Judgment normal.         ICD-10-CM ICD-9-CM    1. Essential hypertension  I10 401.9 amLODIPine (NORVASC) 10 mg tablet      METABOLIC PANEL, COMPREHENSIVE      2. Peripheral vascular disease (HCC)  I73.9 443.9       3. Poorly controlled type 2 diabetes mellitus ([facility])  E11.65 250.00 LIPID PANEL      METABOLIC PANEL, COMPREHENSIVE      HEMOGLOBIN A1C WITH EAG      MICROALBUMIN, UR, RAND W/ MICROALB/CREAT RATIO      COLLECTION VENOUS BLOOD,VENIPUNCTURE      4. Tinea unguium  B35.1 110.1 ketoconazole (NIZORAL) 2 % topical cream      5. Hypercholesterolemia  E78.00 272.0 atorvastatin (LIPITOR) 40 mg tablet      LIPID PANEL      6. Benign prostatic hyperplasia with urinary frequency  N40.1 600.01     R35.0 788.41       7. Onychomycosis  B35.1 110.1 ketoconazole (NIZORAL) 2 % topical cream      8.  Controlled type 2 diabetes mellitus without complication, without long-term current use of insulin ([facility])  E11.9 250.00 metFORMIN ER (GLUCOPHAGE XR) 500 mg tablet          Orders Placed This Encounter    LIPID PANEL     Standing Status:   Future     Standing Expiration Date:   47/90/0799    METABOLIC PANEL, COMPREHENSIVE     Standing Status:   Future     Standing Expiration Date: 2022    HEMOGLOBIN A1C WITH EAG     Standing Status:   Future     Standing Expiration Date:   2022    MICROALBUMIN, UR, RAND W/ MICROALB/CREAT RATIO     Standing Status:   Future     Standing Expiration Date:   2022    COLLECTION VENOUS BLOOD,VENIPUNCTURE    amLODIPine (NORVASC) 10 mg tablet     Sig: Take 1 Tablet by mouth daily. Take 1 Tab by mouth daily. Dispense:  90 Tablet     Refill:  1    atorvastatin (LIPITOR) 40 mg tablet     Sig: Take 1 Tablet by mouth daily. Dispense:  90 Tablet     Refill:  1    ketoconazole (NIZORAL) 2 % topical cream     Sig: Apply  to affected area two (2) times a day. Dispense:  60 g     Refill:  1    metFORMIN ER (GLUCOPHAGE XR) 500 mg tablet     Si twice daily with meals     Dispense:  180 Tablet     Refill:  1       Follow-up and Dispositions    Return in about 3 months (around 2023), or if symptoms worsen or fail to improve.          Martina Rocha MD

## 2022-11-04 ENCOUNTER — TELEPHONE (OUTPATIENT)
Dept: FAMILY MEDICINE CLINIC | Age: 77
End: 2022-11-04

## 2022-11-04 NOTE — TELEPHONE ENCOUNTER
Please call pt and schedule fasting lab appt, looks like Dr Shaun Sherman order labs on 10/20 and he hasn't come in

## 2022-11-04 NOTE — TELEPHONE ENCOUNTER
Pt's wife states Dr. Lidya Ramirez told the pt that he doesn't need his labs done now he can wait until January when he comes in for his follow up

## 2023-01-18 ENCOUNTER — OFFICE VISIT (OUTPATIENT)
Dept: FAMILY MEDICINE CLINIC | Age: 78
End: 2023-01-18
Payer: MEDICARE

## 2023-01-18 VITALS
RESPIRATION RATE: 18 BRPM | HEIGHT: 68 IN | SYSTOLIC BLOOD PRESSURE: 100 MMHG | BODY MASS INDEX: 26.07 KG/M2 | OXYGEN SATURATION: 93 % | TEMPERATURE: 98.7 F | WEIGHT: 172 LBS | DIASTOLIC BLOOD PRESSURE: 63 MMHG | HEART RATE: 78 BPM

## 2023-01-18 DIAGNOSIS — I10 ESSENTIAL HYPERTENSION: Primary | ICD-10-CM

## 2023-01-18 DIAGNOSIS — N40.1 BENIGN PROSTATIC HYPERPLASIA WITH URINARY FREQUENCY: ICD-10-CM

## 2023-01-18 DIAGNOSIS — F33.8 SEASONAL AFFECTIVE DISORDER (HCC): ICD-10-CM

## 2023-01-18 DIAGNOSIS — E11.65 POORLY CONTROLLED TYPE 2 DIABETES MELLITUS (HCC): ICD-10-CM

## 2023-01-18 DIAGNOSIS — E78.00 HYPERCHOLESTEROLEMIA: ICD-10-CM

## 2023-01-18 DIAGNOSIS — R35.0 BENIGN PROSTATIC HYPERPLASIA WITH URINARY FREQUENCY: ICD-10-CM

## 2023-01-18 DIAGNOSIS — I73.9 PERIPHERAL VASCULAR DISEASE (HCC): ICD-10-CM

## 2023-01-18 PROCEDURE — 3074F SYST BP LT 130 MM HG: CPT | Performed by: FAMILY MEDICINE

## 2023-01-18 PROCEDURE — G8417 CALC BMI ABV UP PARAM F/U: HCPCS | Performed by: FAMILY MEDICINE

## 2023-01-18 PROCEDURE — 1101F PT FALLS ASSESS-DOCD LE1/YR: CPT | Performed by: FAMILY MEDICINE

## 2023-01-18 PROCEDURE — G8536 NO DOC ELDER MAL SCRN: HCPCS | Performed by: FAMILY MEDICINE

## 2023-01-18 PROCEDURE — 3078F DIAST BP <80 MM HG: CPT | Performed by: FAMILY MEDICINE

## 2023-01-18 PROCEDURE — 99214 OFFICE O/P EST MOD 30 MIN: CPT | Performed by: FAMILY MEDICINE

## 2023-01-18 PROCEDURE — 36415 COLL VENOUS BLD VENIPUNCTURE: CPT | Performed by: FAMILY MEDICINE

## 2023-01-18 PROCEDURE — G8427 DOCREV CUR MEDS BY ELIG CLIN: HCPCS | Performed by: FAMILY MEDICINE

## 2023-01-18 PROCEDURE — 1123F ACP DISCUSS/DSCN MKR DOCD: CPT | Performed by: FAMILY MEDICINE

## 2023-01-18 PROCEDURE — G8510 SCR DEP NEG, NO PLAN REQD: HCPCS | Performed by: FAMILY MEDICINE

## 2023-01-18 RX ORDER — TRAZODONE HYDROCHLORIDE 150 MG/1
150 TABLET ORAL
Qty: 15 TABLET | Refills: 5 | Status: SHIPPED | OUTPATIENT
Start: 2023-01-18

## 2023-01-18 NOTE — PROGRESS NOTES
Nissa Nicole is a 69 y.o. male who presents with the following:  Chief Complaint   Patient presents with    Hypertension    Diabetes    Cholesterol Problem    Facial Pain       Patient's hypertension is well controlled on current medications he states at home it is generally running about 20 points higher than what was received today when he came in the office. Patient is having no chest pain no shortness of breath no edema. Patient's diabetes has been doing a bit better on the metformin and has had no diarrhea on the medication. The patient's had no numbness or tingling other than what is leftover from his CVA that he had in April. At his generally on the right side of his body mainly in the upper extremity and facial region. The patient is lipids are being treated with atorvastatin without any muscle pain or weakness. Patient is having facial pain generally in the distribution of the middle part of the trigeminal nerve and the intermittent electric shocklike quality of the pain leads me to think this may be more from a trigeminal neuralgia than from a leftover sequelae from his CVA. Patient does have BPH and it is causing him urinary frequency including nocturia of 2-4 times a night. No Known Allergies    Current Outpatient Medications   Medication Sig    traZODone (DESYREL) 150 mg tablet Take 1 Tablet by mouth nightly. Indications: insomnia associated with depression    amLODIPine (NORVASC) 10 mg tablet Take 1 Tablet by mouth daily. Take 1 Tab by mouth daily. atorvastatin (LIPITOR) 40 mg tablet Take 1 Tablet by mouth daily. ketoconazole (NIZORAL) 2 % topical cream Apply  to affected area two (2) times a day. metFORMIN ER (GLUCOPHAGE XR) 500 mg tablet 1 twice daily with meals    glucose blood VI test strips (ASCENSIA AUTODISC VI, ONE TOUCH ULTRA TEST VI) strip Check blood sugar every morning and every evening.  DX E11.0    gabapentin (NEURONTIN) 300 mg capsule TAKE 1 CAPSULE 3 TIMES A DAY Senna Laxative 8.6 mg tablet TAKE 1 TABLET TWICE A DAY AS NEEDED FOR CONSTIPATION    aspirin delayed-release 81 mg tablet Take 1 Tab by mouth daily. Blood-Glucose Meter monitoring kit Use daily as directed to monitor glucose     No current facility-administered medications for this visit. Past Medical History:   Diagnosis Date    DM (diabetes mellitus) ([facility] [address]     History of carotid endarterectomy 7/18/2019    History of CVA (cerebrovascular accident) 4/8/2022    R cerebellar artery- infarct    HTN (hypertension)     Positive FIT (fecal immunochemical test) 10/7/2019    Stroke [facility])        Past Surgical History:   Procedure Laterality Date    HX CAROTID ENDARTERECTOMY Right 06/2019    HX CAROTID ENDARTERECTOMY Left 11/05/2019       Family History   Problem Relation Age of Onset    Diabetes Father        Social History     Socioeconomic History    Marital status:    Tobacco Use    Smoking status: Former    Smokeless tobacco: Never   Substance and Sexual Activity    Alcohol use: Yes     Alcohol/week: 3.0 standard drinks     Types: 3 Cans of beer per week    Drug use: Never    Sexual activity: Yes       Review of Systems   Constitutional:  Negative for chills, fever, malaise/fatigue and weight loss. HENT:  Negative for congestion, hearing loss, sore throat and tinnitus. Eyes:  Negative for blurred vision, pain and discharge. Respiratory:  Negative for cough, shortness of breath and wheezing. Cardiovascular:  Negative for chest pain, palpitations, orthopnea, claudication and leg swelling. Gastrointestinal:  Negative for abdominal pain, constipation and heartburn. Genitourinary:  Positive for frequency. Negative for dysuria and urgency. Musculoskeletal:  Negative for falls, joint pain and myalgias. Skin:  Negative for itching and rash.         Patient does have bilateral athlete's foot and is found that the ketoconazole works exceedingly well and would like this sent to [place] in [address] Neurological:  Negative for dizziness, tingling, tremors and headaches. Endo/Heme/Allergies:  Negative for environmental allergies and polydipsia. Psychiatric/Behavioral:  Positive for depression. Negative for substance abuse. The patient has insomnia. The patient is not nervous/anxious. The patient states that he gets this pressed yearly in the winter because of the short daylight hours and he generally clears up as were getting into March because the days of gotten longer. Patient is also having difficulty sleeping when this comes on. Visit Vitals  /63 (BP 1 Location: Left arm)   Pulse 78   Temp 98.7 °F (37.1 °C)   Resp 18   Ht 5' 8\" (1.727 m)   Wt 172 lb (78 kg)   SpO2 93%   BMI 26.15 kg/m²     Physical Exam  Vitals reviewed. Constitutional:       General: He is not in acute distress. Appearance: Normal appearance. He is well-developed. He is not toxic-appearing. HENT:      Head: Normocephalic and atraumatic. Right Ear: Tympanic membrane, ear canal and external ear normal.      Left Ear: Tympanic membrane, ear canal and external ear normal.      Nose: Nose normal. No congestion or rhinorrhea. Mouth/Throat:      Mouth: Mucous membranes are moist.      Pharynx: No oropharyngeal exudate or posterior oropharyngeal erythema. Eyes:      General:         Right eye: No discharge. Left eye: No discharge. Extraocular Movements: Extraocular movements intact. Conjunctiva/sclera: Conjunctivae normal.      Pupils: Pupils are equal, round, and reactive to light. Comments: Pupil iris and anterior chamber normal.   Neck:      Vascular: No carotid bruit. Trachea: No tracheal deviation. Cardiovascular:      Rate and Rhythm: Normal rate and regular rhythm. Pulses: Normal pulses. Heart sounds: Normal heart sounds. No murmur heard. No friction rub. No gallop. Pulmonary:      Effort: Pulmonary effort is normal. No respiratory distress.       Breath sounds: Normal breath sounds. No wheezing, rhonchi or rales. Chest:      Chest wall: No tenderness. Abdominal:      General: Bowel sounds are normal. There is no distension. Palpations: Abdomen is soft. There is no mass. Tenderness: There is no abdominal tenderness. There is no guarding or rebound. Hernia: No hernia is present. Musculoskeletal:         General: No swelling or tenderness. Normal range of motion. Cervical back: Normal range of motion and neck supple. Right lower leg: No edema. Left lower leg: No edema. Comments: Patient has weakness in flexion and extension at the elbow and abduction in the shoulder after his CVA. He generally has more strength than he had on his previous visit and this should improve with exercise. Lymphadenopathy:      Cervical: No cervical adenopathy. Skin:     General: Skin is warm and dry. Coloration: Skin is not pale. Findings: No erythema or rash. Neurological:      General: No focal deficit present. Mental Status: He is alert and oriented to person, place, and time. Cranial Nerves: No cranial nerve deficit. Sensory: No sensory deficit. Motor: No abnormal muscle tone. Deep Tendon Reflexes: Reflexes are normal and symmetric. Reflexes normal.      Comments: Cranial nerves II through XII intact sensory and motor. Deep tendon reflexes in the biceps triceps knee and ankle are normal and bilaterally symmetrical.   Psychiatric:         Behavior: Behavior normal.         Thought Content: Thought content normal.         Judgment: Judgment normal.      Comments: The patient's mood is somewhat sad. ICD-10-CM ICD-9-CM    1. Essential hypertension  I10 401.9       2.  Poorly controlled type 2 diabetes mellitus (HCC)  E11.65 250.00 LIPID PANEL      METABOLIC PANEL, COMPREHENSIVE      HEMOGLOBIN A1C WITH EAG      MICROALBUMIN, UR, RAND W/ MICROALB/CREAT RATIO      DC COLLECTION VENOUS BLOOD VENIPUNCTURE MICROALBUMIN, UR, RAND W/ MICROALB/CREAT RATIO      HEMOGLOBIN A1C WITH EAG      METABOLIC PANEL, COMPREHENSIVE      LIPID PANEL      3. Peripheral vascular disease (HCC)  I73.9 443.9       4. Hypercholesterolemia  E78.00 272.0       5. Benign prostatic hyperplasia with urinary frequency  N40.1 600.01     R35.0 788.41       6. Seasonal affective disorder ([facility] [address]  F33.8 296.99 traZODone (DESYREL) 150 mg tablet          Orders Placed This Encounter    LIPID PANEL     Standing Status:   Future     Number of Occurrences:   1     Standing Expiration Date:   1/03/1629    METABOLIC PANEL, COMPREHENSIVE     Standing Status:   Future     Number of Occurrences:   1     Standing Expiration Date:   2/18/2023    HEMOGLOBIN A1C WITH EAG     Standing Status:   Future     Number of Occurrences:   1     Standing Expiration Date:   2/18/2023    MICROALBUMIN, UR, RAND W/ MICROALB/CREAT RATIO     Standing Status:   Future     Number of Occurrences:   1     Standing Expiration Date:   2/18/2023    TN COLLECTION VENOUS BLOOD VENIPUNCTURE    traZODone (DESYREL) 150 mg tablet     Sig: Take 1 Tablet by mouth nightly. Indications: insomnia associated with depression     Dispense:  15 Tablet     Refill:  5       Follow-up and Dispositions    Return in about 6 months (around 7/18/2023), or if symptoms worsen or fail to improve.          Sarahy Luis MD

## 2023-01-18 NOTE — PROGRESS NOTES
1. \"Have you been to the ER, urgent care clinic since your last visit? Hospitalized since your last visit? \" No    2. \"Have you seen or consulted any other health care providers outside of the [address] [place] [address] since your last visit? \" No     3. For patients aged 39-70: Has the patient had a colonoscopy / FIT/ Cologuard? No     If the patient is female:    4. For patients aged 41-77: Has the patient had a mammogram within the past 2 years? No    5. For patients aged 21-65: Has the patient had a pap smear?  No

## 2023-01-19 LAB
ALBUMIN SERPL-MCNC: 4.1 G/DL (ref 3.5–5)
ALBUMIN/GLOB SERPL: 1.4 (ref 1.1–2.2)
ALP SERPL-CCNC: 94 U/L (ref 45–117)
ALT SERPL-CCNC: 27 U/L (ref 12–78)
ANION GAP SERPL CALC-SCNC: 5 MMOL/L (ref 5–15)
AST SERPL-CCNC: 18 U/L (ref 15–37)
BILIRUB SERPL-MCNC: 0.8 MG/DL (ref 0.2–1)
BUN SERPL-MCNC: 20 MG/DL (ref 6–20)
BUN/CREAT SERPL: 16 (ref 12–20)
CALCIUM SERPL-MCNC: 9.4 MG/DL (ref 8.5–10.1)
CHLORIDE SERPL-SCNC: 104 MMOL/L (ref 97–108)
CHOLEST SERPL-MCNC: 126 MG/DL
CO2 SERPL-SCNC: 31 MMOL/L (ref 21–32)
CREAT SERPL-MCNC: 1.27 MG/DL (ref 0.7–1.3)
CREAT UR-MCNC: 403 MG/DL
EST. AVERAGE GLUCOSE BLD GHB EST-MCNC: 171 MG/DL
GLOBULIN SER CALC-MCNC: 2.9 G/DL (ref 2–4)
GLUCOSE SERPL-MCNC: 162 MG/DL (ref 65–100)
HBA1C MFR BLD: 7.6 % (ref 4–5.6)
HDLC SERPL-MCNC: 51 MG/DL
HDLC SERPL: 2.5 (ref 0–5)
LDLC SERPL CALC-MCNC: 57.8 MG/DL (ref 0–100)
MICROALBUMIN UR-MCNC: 8.52 MG/DL
MICROALBUMIN/CREAT UR-RTO: 21 MG/G (ref 0–30)
POTASSIUM SERPL-SCNC: 4.1 MMOL/L (ref 3.5–5.1)
PROT SERPL-MCNC: 7 G/DL (ref 6.4–8.2)
SODIUM SERPL-SCNC: 140 MMOL/L (ref 136–145)
TRIGL SERPL-MCNC: 86 MG/DL (ref ?–150)
VLDLC SERPL CALC-MCNC: 17.2 MG/DL

## 2023-04-13 PROBLEM — B35.1 ONYCHOMYCOSIS: Status: ACTIVE | Noted: 2023-04-13

## 2023-05-16 RX ORDER — KETOCONAZOLE 20 MG/G
CREAM TOPICAL 2 TIMES DAILY
COMMUNITY
Start: 2023-04-13

## 2023-05-16 RX ORDER — TRAZODONE HYDROCHLORIDE 150 MG/1
150 TABLET ORAL
COMMUNITY
Start: 2023-02-09

## 2023-05-16 RX ORDER — GABAPENTIN 300 MG/1
1 CAPSULE ORAL 3 TIMES DAILY
COMMUNITY
Start: 2022-05-18

## 2023-05-16 RX ORDER — SENNA PLUS 8.6 MG/1
TABLET ORAL
COMMUNITY
Start: 2022-05-18

## 2023-05-16 RX ORDER — ATORVASTATIN CALCIUM 40 MG/1
40 TABLET, FILM COATED ORAL DAILY
COMMUNITY
Start: 2022-10-20

## 2023-05-16 RX ORDER — METFORMIN HYDROCHLORIDE 500 MG/1
TABLET, EXTENDED RELEASE ORAL
COMMUNITY
Start: 2022-10-20

## 2023-05-16 RX ORDER — AMLODIPINE BESYLATE 10 MG/1
10 TABLET ORAL DAILY
COMMUNITY
Start: 2022-10-20 | End: 2023-06-27

## 2023-05-16 RX ORDER — ASPIRIN 81 MG/1
81 TABLET ORAL DAILY
COMMUNITY
Start: 2021-03-18

## 2023-06-24 DIAGNOSIS — I10 ESSENTIAL (PRIMARY) HYPERTENSION: ICD-10-CM

## 2023-06-27 RX ORDER — AMLODIPINE BESYLATE 10 MG/1
10 TABLET ORAL DAILY
Qty: 90 TABLET | Refills: 1 | Status: SHIPPED | OUTPATIENT
Start: 2023-06-27

## 2023-07-20 DIAGNOSIS — E78.00 PURE HYPERCHOLESTEROLEMIA, UNSPECIFIED: ICD-10-CM

## 2023-07-21 RX ORDER — ATORVASTATIN CALCIUM 40 MG/1
TABLET, FILM COATED ORAL
Qty: 90 TABLET | Refills: 0 | Status: SHIPPED | OUTPATIENT
Start: 2023-07-21

## 2023-07-21 NOTE — TELEPHONE ENCOUNTER
Patient requesting refill on     Requested Prescriptions     Pending Prescriptions Disp Refills    atorvastatin (LIPITOR) 40 MG tablet [Pharmacy Med Name: ATORVASTATIN 40 MG TABLET] 90 tablet 1     Sig: TAKE 1 TABLET BY MOUTH EVERY DAY        Last OV 4/13/2023

## 2023-08-31 ENCOUNTER — TELEPHONE (OUTPATIENT)
Age: 78
End: 2023-08-31

## 2023-09-07 ENCOUNTER — TELEPHONE (OUTPATIENT)
Age: 78
End: 2023-09-07

## 2023-10-02 ENCOUNTER — TELEPHONE (OUTPATIENT)
Age: 78
End: 2023-10-02

## 2023-10-02 NOTE — TELEPHONE ENCOUNTER
[facility]rolog[facility] Dr. Mandy Primrose at [facility]. His appt is 10/9/2023. Stroke related.

## 2023-10-03 DIAGNOSIS — G62.9 NEUROPATHY: Primary | ICD-10-CM

## 2023-10-12 DIAGNOSIS — E78.00 PURE HYPERCHOLESTEROLEMIA, UNSPECIFIED: ICD-10-CM

## 2023-10-12 RX ORDER — ATORVASTATIN CALCIUM 40 MG/1
TABLET, FILM COATED ORAL
Qty: 90 TABLET | Refills: 0 | OUTPATIENT
Start: 2023-10-12

## 2023-10-12 NOTE — TELEPHONE ENCOUNTER
Patient requesting refill on     Requested Prescriptions     Pending Prescriptions Disp Refills    atorvastatin (LIPITOR) 40 MG tablet [Pharmacy Med Name: ATORVASTATIN 40 MG TABLET] 90 tablet 0     Sig: TAKE 1 TABLET BY MOUTH EVERY DAY        Last OV 4/13/2023

## 2023-10-13 ENCOUNTER — OFFICE VISIT (OUTPATIENT)
Age: 78
End: 2023-10-13
Payer: OTHER GOVERNMENT

## 2023-10-13 VITALS
HEIGHT: 68 IN | HEART RATE: 70 BPM | RESPIRATION RATE: 18 BRPM | SYSTOLIC BLOOD PRESSURE: 130 MMHG | DIASTOLIC BLOOD PRESSURE: 77 MMHG | WEIGHT: 179.36 LBS | BODY MASS INDEX: 27.18 KG/M2 | OXYGEN SATURATION: 98 % | TEMPERATURE: 98.1 F

## 2023-10-13 DIAGNOSIS — E78.00 HYPERCHOLESTEROLEMIA: ICD-10-CM

## 2023-10-13 DIAGNOSIS — I73.9 PERIPHERAL VASCULAR DISEASE (HCC): ICD-10-CM

## 2023-10-13 DIAGNOSIS — E11.59 TYPE 2 DIABETES MELLITUS WITH OTHER CIRCULATORY COMPLICATION, WITHOUT LONG-TERM CURRENT USE OF INSULIN (HCC): Primary | ICD-10-CM

## 2023-10-13 DIAGNOSIS — I10 ESSENTIAL HYPERTENSION: ICD-10-CM

## 2023-10-13 DIAGNOSIS — F33.8 SEASONAL AFFECTIVE DISORDER (HCC): ICD-10-CM

## 2023-10-13 PROBLEM — E11.9 DIABETES MELLITUS (HCC): Status: ACTIVE | Noted: 2019-07-18

## 2023-10-13 PROCEDURE — 36415 COLL VENOUS BLD VENIPUNCTURE: CPT | Performed by: FAMILY MEDICINE

## 2023-10-13 PROCEDURE — 1123F ACP DISCUSS/DSCN MKR DOCD: CPT | Performed by: FAMILY MEDICINE

## 2023-10-13 PROCEDURE — 99214 OFFICE O/P EST MOD 30 MIN: CPT | Performed by: FAMILY MEDICINE

## 2023-10-13 PROCEDURE — 3075F SYST BP GE 130 - 139MM HG: CPT | Performed by: FAMILY MEDICINE

## 2023-10-13 PROCEDURE — 3051F HG A1C>EQUAL 7.0%<8.0%: CPT | Performed by: FAMILY MEDICINE

## 2023-10-13 PROCEDURE — 3078F DIAST BP <80 MM HG: CPT | Performed by: FAMILY MEDICINE

## 2023-10-13 SDOH — ECONOMIC STABILITY: HOUSING INSECURITY
IN THE LAST 12 MONTHS, WAS THERE A TIME WHEN YOU DID NOT HAVE A STEADY PLACE TO SLEEP OR SLEPT IN A SHELTER (INCLUDING NOW)?: NO

## 2023-10-13 SDOH — ECONOMIC STABILITY: FOOD INSECURITY: WITHIN THE PAST 12 MONTHS, THE FOOD YOU BOUGHT JUST DIDN'T LAST AND YOU DIDN'T HAVE MONEY TO GET MORE.: NEVER TRUE

## 2023-10-13 SDOH — ECONOMIC STABILITY: FOOD INSECURITY: WITHIN THE PAST 12 MONTHS, YOU WORRIED THAT YOUR FOOD WOULD RUN OUT BEFORE YOU GOT MONEY TO BUY MORE.: NEVER TRUE

## 2023-10-13 SDOH — ECONOMIC STABILITY: INCOME INSECURITY: HOW HARD IS IT FOR YOU TO PAY FOR THE VERY BASICS LIKE FOOD, HOUSING, MEDICAL CARE, AND HEATING?: NOT HARD AT ALL

## 2023-10-13 ASSESSMENT — ANXIETY QUESTIONNAIRES
GAD7 TOTAL SCORE: 0
1. FEELING NERVOUS, ANXIOUS, OR ON EDGE: 0
3. WORRYING TOO MUCH ABOUT DIFFERENT THINGS: 0
7. FEELING AFRAID AS IF SOMETHING AWFUL MIGHT HAPPEN: 0
6. BECOMING EASILY ANNOYED OR IRRITABLE: 0
IF YOU CHECKED OFF ANY PROBLEMS ON THIS QUESTIONNAIRE, HOW DIFFICULT HAVE THESE PROBLEMS MADE IT FOR YOU TO DO YOUR WORK, TAKE CARE OF THINGS AT HOME, OR GET ALONG WITH OTHER PEOPLE: NOT DIFFICULT AT ALL
5. BEING SO RESTLESS THAT IT IS HARD TO SIT STILL: 0
2. NOT BEING ABLE TO STOP OR CONTROL WORRYING: 0
4. TROUBLE RELAXING: 0

## 2023-10-13 ASSESSMENT — ENCOUNTER SYMPTOMS
SINUS PRESSURE: 0
EYE PAIN: 0
BACK PAIN: 0
WHEEZING: 0
ANAL BLEEDING: 0
VOICE CHANGE: 0
DIARRHEA: 0
COUGH: 0
SHORTNESS OF BREATH: 0
EYE REDNESS: 0
ABDOMINAL PAIN: 0
CONSTIPATION: 0
RHINORRHEA: 0
ABDOMINAL DISTENTION: 0
VOMITING: 0
NAUSEA: 0
SINUS PAIN: 0
CHEST TIGHTNESS: 0
BLOOD IN STOOL: 0

## 2023-10-13 ASSESSMENT — PATIENT HEALTH QUESTIONNAIRE - PHQ9
3. TROUBLE FALLING OR STAYING ASLEEP: 0
5. POOR APPETITE OR OVEREATING: 0
SUM OF ALL RESPONSES TO PHQ9 QUESTIONS 1 & 2: 0
2. FEELING DOWN, DEPRESSED OR HOPELESS: 0
SUM OF ALL RESPONSES TO PHQ QUESTIONS 1-9: 0
9. THOUGHTS THAT YOU WOULD BE BETTER OFF DEAD, OR OF HURTING YOURSELF: 0
7. TROUBLE CONCENTRATING ON THINGS, SUCH AS READING THE NEWSPAPER OR WATCHING TELEVISION: 0
SUM OF ALL RESPONSES TO PHQ QUESTIONS 1-9: 0
4. FEELING TIRED OR HAVING LITTLE ENERGY: 0
8. MOVING OR SPEAKING SO SLOWLY THAT OTHER PEOPLE COULD HAVE NOTICED. OR THE OPPOSITE, BEING SO FIGETY OR RESTLESS THAT YOU HAVE BEEN MOVING AROUND A LOT MORE THAN USUAL: 0
10. IF YOU CHECKED OFF ANY PROBLEMS, HOW DIFFICULT HAVE THESE PROBLEMS MADE IT FOR YOU TO DO YOUR WORK, TAKE CARE OF THINGS AT HOME, OR GET ALONG WITH OTHER PEOPLE: 0
1. LITTLE INTEREST OR PLEASURE IN DOING THINGS: 0
SUM OF ALL RESPONSES TO PHQ QUESTIONS 1-9: 0
6. FEELING BAD ABOUT YOURSELF - OR THAT YOU ARE A FAILURE OR HAVE LET YOURSELF OR YOUR FAMILY DOWN: 0
SUM OF ALL RESPONSES TO PHQ QUESTIONS 1-9: 0

## 2023-10-14 LAB
ALBUMIN SERPL-MCNC: 3.8 G/DL (ref 3.5–5)
ALBUMIN/GLOB SERPL: 1.2 (ref 1.1–2.2)
ALP SERPL-CCNC: 102 U/L (ref 45–117)
ALT SERPL-CCNC: 29 U/L (ref 12–78)
ANION GAP SERPL CALC-SCNC: 4 MMOL/L (ref 5–15)
AST SERPL-CCNC: 20 U/L (ref 15–37)
BILIRUB SERPL-MCNC: 0.7 MG/DL (ref 0.2–1)
BUN SERPL-MCNC: 14 MG/DL (ref 6–20)
BUN/CREAT SERPL: 11 (ref 12–20)
CALCIUM SERPL-MCNC: 9.4 MG/DL (ref 8.5–10.1)
CHLORIDE SERPL-SCNC: 102 MMOL/L (ref 97–108)
CHOLEST SERPL-MCNC: 122 MG/DL
CO2 SERPL-SCNC: 29 MMOL/L (ref 21–32)
CREAT SERPL-MCNC: 1.27 MG/DL (ref 0.7–1.3)
CREAT UR-MCNC: 166 MG/DL
EST. AVERAGE GLUCOSE BLD GHB EST-MCNC: 189 MG/DL
GLOBULIN SER CALC-MCNC: 3.2 G/DL (ref 2–4)
GLUCOSE SERPL-MCNC: 253 MG/DL (ref 65–100)
HBA1C MFR BLD: 8.2 % (ref 4–5.6)
HDLC SERPL-MCNC: 53 MG/DL
HDLC SERPL: 2.3 (ref 0–5)
LDLC SERPL CALC-MCNC: 48.4 MG/DL (ref 0–100)
MICROALBUMIN UR-MCNC: 5.29 MG/DL
MICROALBUMIN/CREAT UR-RTO: 32 MG/G (ref 0–30)
POTASSIUM SERPL-SCNC: 4.3 MMOL/L (ref 3.5–5.1)
PROT SERPL-MCNC: 7 G/DL (ref 6.4–8.2)
SODIUM SERPL-SCNC: 135 MMOL/L (ref 136–145)
TRIGL SERPL-MCNC: 103 MG/DL
VLDLC SERPL CALC-MCNC: 20.6 MG/DL

## 2023-11-03 DIAGNOSIS — E78.00 PURE HYPERCHOLESTEROLEMIA, UNSPECIFIED: ICD-10-CM

## 2023-11-03 RX ORDER — ATORVASTATIN CALCIUM 40 MG/1
TABLET, FILM COATED ORAL
Qty: 90 TABLET | Refills: 1 | Status: SHIPPED | OUTPATIENT
Start: 2023-11-03

## 2023-11-09 DIAGNOSIS — E11.9 TYPE 2 DIABETES MELLITUS WITHOUT COMPLICATIONS (HCC): ICD-10-CM

## 2023-11-10 ENCOUNTER — TELEPHONE (OUTPATIENT)
Age: 78
End: 2023-11-10

## 2023-11-14 RX ORDER — METFORMIN HYDROCHLORIDE 500 MG/1
500 TABLET, EXTENDED RELEASE ORAL 2 TIMES DAILY WITH MEALS
Qty: 180 TABLET | Refills: 1 | Status: SHIPPED | OUTPATIENT
Start: 2023-11-14

## 2024-03-24 DIAGNOSIS — I10 ESSENTIAL (PRIMARY) HYPERTENSION: ICD-10-CM

## 2024-03-25 NOTE — TELEPHONE ENCOUNTER
Patient requesting refill on     Requested Prescriptions     Pending Prescriptions Disp Refills    amLODIPine (NORVASC) 10 MG tablet [Pharmacy Med Name: AMLODIPINE BESYLATE 10 MG TAB] 90 tablet 1     Sig: TAKE 1 TABLET BY MOUTH EVERY DAY        Last OV 10/13/2023

## 2024-03-26 RX ORDER — AMLODIPINE BESYLATE 10 MG/1
10 TABLET ORAL DAILY
Qty: 90 TABLET | Refills: 1 | Status: SHIPPED | OUTPATIENT
Start: 2024-03-26

## 2024-04-17 ENCOUNTER — OFFICE VISIT (OUTPATIENT)
Age: 79
End: 2024-04-17
Payer: OTHER GOVERNMENT

## 2024-04-17 VITALS
RESPIRATION RATE: 18 BRPM | BODY MASS INDEX: 26.98 KG/M2 | OXYGEN SATURATION: 96 % | HEART RATE: 56 BPM | DIASTOLIC BLOOD PRESSURE: 70 MMHG | TEMPERATURE: 97.3 F | WEIGHT: 178 LBS | HEIGHT: 68 IN | SYSTOLIC BLOOD PRESSURE: 141 MMHG

## 2024-04-17 DIAGNOSIS — Z87.891 FORMER SMOKER: ICD-10-CM

## 2024-04-17 DIAGNOSIS — H69.91 EUSTACHIAN TUBE DYSFUNCTION, RIGHT: ICD-10-CM

## 2024-04-17 DIAGNOSIS — I69.398 WEAKNESS STATUS POST CEREBROVASCULAR ACCIDENT: ICD-10-CM

## 2024-04-17 DIAGNOSIS — F33.8 SEASONAL AFFECTIVE DISORDER (HCC): ICD-10-CM

## 2024-04-17 DIAGNOSIS — E78.00 HYPERCHOLESTEROLEMIA: Primary | ICD-10-CM

## 2024-04-17 DIAGNOSIS — H10.31 ACUTE BACTERIAL CONJUNCTIVITIS OF RIGHT EYE: ICD-10-CM

## 2024-04-17 DIAGNOSIS — E11.59 TYPE 2 DIABETES MELLITUS WITH OTHER CIRCULATORY COMPLICATION, WITHOUT LONG-TERM CURRENT USE OF INSULIN (HCC): ICD-10-CM

## 2024-04-17 DIAGNOSIS — I73.9 PERIPHERAL VASCULAR DISEASE (HCC): ICD-10-CM

## 2024-04-17 DIAGNOSIS — R53.1 WEAKNESS STATUS POST CEREBROVASCULAR ACCIDENT: ICD-10-CM

## 2024-04-17 DIAGNOSIS — B35.1 ONYCHOMYCOSIS: ICD-10-CM

## 2024-04-17 PROCEDURE — 99214 OFFICE O/P EST MOD 30 MIN: CPT | Performed by: FAMILY MEDICINE

## 2024-04-17 PROCEDURE — 3077F SYST BP >= 140 MM HG: CPT | Performed by: FAMILY MEDICINE

## 2024-04-17 PROCEDURE — 36415 COLL VENOUS BLD VENIPUNCTURE: CPT | Performed by: FAMILY MEDICINE

## 2024-04-17 PROCEDURE — 3078F DIAST BP <80 MM HG: CPT | Performed by: FAMILY MEDICINE

## 2024-04-17 PROCEDURE — 1123F ACP DISCUSS/DSCN MKR DOCD: CPT | Performed by: FAMILY MEDICINE

## 2024-04-17 RX ORDER — CLINDAMYCIN HYDROCHLORIDE 150 MG/1
150 CAPSULE ORAL 3 TIMES DAILY
Qty: 30 CAPSULE | Refills: 0 | Status: SHIPPED | OUTPATIENT
Start: 2024-04-17 | End: 2024-04-27

## 2024-04-17 RX ORDER — SULFACETAMIDE SODIUM 100 MG/G
0.5 OINTMENT OPHTHALMIC 3 TIMES DAILY
Qty: 3.5 G | Refills: 0 | Status: SHIPPED | OUTPATIENT
Start: 2024-04-17 | End: 2024-04-27

## 2024-04-17 RX ORDER — PREDNISONE 20 MG/1
TABLET ORAL
Qty: 9 TABLET | Refills: 0 | Status: SHIPPED | OUTPATIENT
Start: 2024-04-17

## 2024-04-17 ASSESSMENT — ENCOUNTER SYMPTOMS
DIARRHEA: 0
ANAL BLEEDING: 0
SHORTNESS OF BREATH: 0
EYE PAIN: 0
SINUS PAIN: 1
RHINORRHEA: 1
ABDOMINAL PAIN: 0
BACK PAIN: 0
BLOOD IN STOOL: 0
EYE DISCHARGE: 1
CHEST TIGHTNESS: 0
VOMITING: 0
VOICE CHANGE: 0
ABDOMINAL DISTENTION: 0
COUGH: 0
CONSTIPATION: 0
SINUS PRESSURE: 1
NAUSEA: 0
EYE REDNESS: 1
WHEEZING: 0
PHOTOPHOBIA: 0

## 2024-04-17 ASSESSMENT — PATIENT HEALTH QUESTIONNAIRE - PHQ9
SUM OF ALL RESPONSES TO PHQ QUESTIONS 1-9: 2
SUM OF ALL RESPONSES TO PHQ QUESTIONS 1-9: 2
SUM OF ALL RESPONSES TO PHQ9 QUESTIONS 1 & 2: 2
SUM OF ALL RESPONSES TO PHQ QUESTIONS 1-9: 2
SUM OF ALL RESPONSES TO PHQ QUESTIONS 1-9: 2
1. LITTLE INTEREST OR PLEASURE IN DOING THINGS: SEVERAL DAYS
2. FEELING DOWN, DEPRESSED OR HOPELESS: SEVERAL DAYS

## 2024-04-17 NOTE — PROGRESS NOTES
Labs drawn in left arm per Dr Garcia's orders. Pt tolerated well.\"Have you been to the ER, urgent care clinic since your last visit?  Hospitalized since your last visit?\"    NO    “Have you seen or consulted any other health care providers outside of [facility] since your last visit?”    NO            Click Here for Release of Records Request

## 2024-04-17 NOTE — PROGRESS NOTES
Alok Man is a 78 y.o. male who presents with the following:  Chief Complaint   Patient presents with    Diabetes    Hypertension    Cholesterol Problem    Benign Prostatic Hypertrophy    Peripheral vascular disease       Patient comes in today stating his diabetes needs to be checked but he feels like it has been doing fairly well although he has not been eating properly and all the sugars he is checked have been over 200.  He denies any polyuria nor any abnormal weight loss but he is complaining of a red eye on the right.  The patient is having no claudication from his peripheral vascular disease and his essential hypertension is doing reasonably well although slightly elevated in the systolic at 141.  Patient denies any new CNS events although he still having difficulty with his balance and he uses a cane to help keep from falling when he walks.  I have suggested that he use 2 walking sticks when he is out and about or even better to use a 4-point walker.  The patient does demonstrate some weakness in his gait that I believe could be corrected along with some balance training by physical therapy.  Patient does have onychomycosis but this seems to be doing well on his current antifungal and his hypercholesterolemia is being treated with atorvastatin without any muscle pain or weakness.  The patient is no longer smoking and he does have benign prostatic hyperplasia with urinary frequency but his PSAs have been doing well.  His next one would be due in July or August.  Patient does have seasonal affective disorder in the sunshine out lately seems to be brightening him up some.  The patient is having some congestion in his head especially on the right-hand side and he did have a fair amount of difficulty clearing his ears with Valsalva.        No Known Allergies    Current Outpatient Medications   Medication Sig Dispense Refill    clindamycin (CLEOCIN) 150 MG capsule Take 1 capsule by mouth 3 times daily for 10 days

## 2024-04-18 LAB
ALBUMIN SERPL-MCNC: 4 G/DL (ref 3.5–5)
ALBUMIN/GLOB SERPL: 1.3 (ref 1.1–2.2)
ALP SERPL-CCNC: 95 U/L (ref 45–117)
ALT SERPL-CCNC: 26 U/L (ref 12–78)
ANION GAP SERPL CALC-SCNC: 7 MMOL/L (ref 5–15)
AST SERPL-CCNC: 17 U/L (ref 15–37)
BILIRUB SERPL-MCNC: 0.9 MG/DL (ref 0.2–1)
BUN SERPL-MCNC: 23 MG/DL (ref 6–20)
BUN/CREAT SERPL: 19 (ref 12–20)
CALCIUM SERPL-MCNC: 9.5 MG/DL (ref 8.5–10.1)
CHLORIDE SERPL-SCNC: 103 MMOL/L (ref 97–108)
CHOLEST SERPL-MCNC: 123 MG/DL
CO2 SERPL-SCNC: 29 MMOL/L (ref 21–32)
CREAT SERPL-MCNC: 1.18 MG/DL (ref 0.7–1.3)
ERYTHROCYTE [DISTWIDTH] IN BLOOD BY AUTOMATED COUNT: 12.7 % (ref 11.5–14.5)
EST. AVERAGE GLUCOSE BLD GHB EST-MCNC: 203 MG/DL
GLOBULIN SER CALC-MCNC: 3.2 G/DL (ref 2–4)
GLUCOSE SERPL-MCNC: 159 MG/DL (ref 65–100)
HBA1C MFR BLD: 8.7 % (ref 4–5.6)
HCT VFR BLD AUTO: 42.8 % (ref 36.6–50.3)
HDLC SERPL-MCNC: 52 MG/DL
HDLC SERPL: 2.4 (ref 0–5)
HGB BLD-MCNC: 14.8 G/DL (ref 12.1–17)
LDLC SERPL CALC-MCNC: 55.2 MG/DL (ref 0–100)
MCH RBC QN AUTO: 32 PG (ref 26–34)
MCHC RBC AUTO-ENTMCNC: 34.6 G/DL (ref 30–36.5)
MCV RBC AUTO: 92.4 FL (ref 80–99)
NRBC # BLD: 0 K/UL (ref 0–0.01)
NRBC BLD-RTO: 0 PER 100 WBC
PLATELET # BLD AUTO: 265 K/UL (ref 150–400)
PMV BLD AUTO: 11 FL (ref 8.9–12.9)
POTASSIUM SERPL-SCNC: 4.1 MMOL/L (ref 3.5–5.1)
PROT SERPL-MCNC: 7.2 G/DL (ref 6.4–8.2)
RBC # BLD AUTO: 4.63 M/UL (ref 4.1–5.7)
SODIUM SERPL-SCNC: 139 MMOL/L (ref 136–145)
TRIGL SERPL-MCNC: 79 MG/DL
VLDLC SERPL CALC-MCNC: 15.8 MG/DL
WBC # BLD AUTO: 6.1 K/UL (ref 4.1–11.1)

## 2024-05-10 ENCOUNTER — TELEPHONE (OUTPATIENT)
Age: 79
End: 2024-05-10

## 2024-05-10 NOTE — TELEPHONE ENCOUNTER
[facility]erral needed [facility]. Dates on scan are . Needs new one. See last scan.

## 2024-06-06 ENCOUNTER — OFFICE VISIT (OUTPATIENT)
Age: 79
End: 2024-06-06
Payer: OTHER GOVERNMENT

## 2024-06-06 VITALS
TEMPERATURE: 98.4 F | OXYGEN SATURATION: 98 % | HEIGHT: 68 IN | HEART RATE: 73 BPM | WEIGHT: 175 LBS | RESPIRATION RATE: 20 BRPM | SYSTOLIC BLOOD PRESSURE: 121 MMHG | BODY MASS INDEX: 26.52 KG/M2 | DIASTOLIC BLOOD PRESSURE: 73 MMHG

## 2024-06-06 DIAGNOSIS — E78.00 HYPERCHOLESTEROLEMIA: Primary | ICD-10-CM

## 2024-06-06 DIAGNOSIS — I73.9 PERIPHERAL VASCULAR DISEASE (HCC): ICD-10-CM

## 2024-06-06 DIAGNOSIS — F33.8 SEASONAL AFFECTIVE DISORDER (HCC): ICD-10-CM

## 2024-06-06 DIAGNOSIS — G62.9 NEUROPATHY: ICD-10-CM

## 2024-06-06 DIAGNOSIS — E11.59 TYPE 2 DIABETES MELLITUS WITH OTHER CIRCULATORY COMPLICATION, WITHOUT LONG-TERM CURRENT USE OF INSULIN (HCC): ICD-10-CM

## 2024-06-06 DIAGNOSIS — N40.1 BENIGN PROSTATIC HYPERPLASIA WITH URINARY FREQUENCY: ICD-10-CM

## 2024-06-06 DIAGNOSIS — R35.0 BENIGN PROSTATIC HYPERPLASIA WITH URINARY FREQUENCY: ICD-10-CM

## 2024-06-06 DIAGNOSIS — I10 ESSENTIAL HYPERTENSION: ICD-10-CM

## 2024-06-06 PROCEDURE — 99214 OFFICE O/P EST MOD 30 MIN: CPT | Performed by: FAMILY MEDICINE

## 2024-06-06 PROCEDURE — 1123F ACP DISCUSS/DSCN MKR DOCD: CPT | Performed by: FAMILY MEDICINE

## 2024-06-06 PROCEDURE — 3078F DIAST BP <80 MM HG: CPT | Performed by: FAMILY MEDICINE

## 2024-06-06 PROCEDURE — 3052F HG A1C>EQUAL 8.0%<EQUAL 9.0%: CPT | Performed by: FAMILY MEDICINE

## 2024-06-06 PROCEDURE — 3074F SYST BP LT 130 MM HG: CPT | Performed by: FAMILY MEDICINE

## 2024-06-06 RX ORDER — GABAPENTIN 600 MG/1
600 TABLET ORAL 3 TIMES DAILY
Qty: 90 TABLET | Refills: 2 | Status: SHIPPED | OUTPATIENT
Start: 2024-06-06 | End: 2024-09-04

## 2024-06-06 SDOH — ECONOMIC STABILITY: INCOME INSECURITY: HOW HARD IS IT FOR YOU TO PAY FOR THE VERY BASICS LIKE FOOD, HOUSING, MEDICAL CARE, AND HEATING?: NOT HARD AT ALL

## 2024-06-06 SDOH — ECONOMIC STABILITY: FOOD INSECURITY: WITHIN THE PAST 12 MONTHS, THE FOOD YOU BOUGHT JUST DIDN'T LAST AND YOU DIDN'T HAVE MONEY TO GET MORE.: NEVER TRUE

## 2024-06-06 SDOH — ECONOMIC STABILITY: FOOD INSECURITY: WITHIN THE PAST 12 MONTHS, YOU WORRIED THAT YOUR FOOD WOULD RUN OUT BEFORE YOU GOT MONEY TO BUY MORE.: NEVER TRUE

## 2024-06-06 ASSESSMENT — ENCOUNTER SYMPTOMS
ABDOMINAL PAIN: 0
EYE REDNESS: 0
VOICE CHANGE: 0
VOMITING: 0
EYE PAIN: 0
RHINORRHEA: 0
CONSTIPATION: 0
WHEEZING: 0
BLOOD IN STOOL: 0
ANAL BLEEDING: 0
BACK PAIN: 0
SINUS PRESSURE: 0
CHEST TIGHTNESS: 0
ABDOMINAL DISTENTION: 0
COUGH: 0
NAUSEA: 0
SINUS PAIN: 0
DIARRHEA: 0
SHORTNESS OF BREATH: 0

## 2024-06-06 ASSESSMENT — PATIENT HEALTH QUESTIONNAIRE - PHQ9
3. TROUBLE FALLING OR STAYING ASLEEP: NOT AT ALL
SUM OF ALL RESPONSES TO PHQ9 QUESTIONS 1 & 2: 0
4. FEELING TIRED OR HAVING LITTLE ENERGY: NOT AT ALL
9. THOUGHTS THAT YOU WOULD BE BETTER OFF DEAD, OR OF HURTING YOURSELF: NOT AT ALL
5. POOR APPETITE OR OVEREATING: NOT AT ALL
SUM OF ALL RESPONSES TO PHQ QUESTIONS 1-9: 0
7. TROUBLE CONCENTRATING ON THINGS, SUCH AS READING THE NEWSPAPER OR WATCHING TELEVISION: NOT AT ALL
SUM OF ALL RESPONSES TO PHQ QUESTIONS 1-9: 0
6. FEELING BAD ABOUT YOURSELF - OR THAT YOU ARE A FAILURE OR HAVE LET YOURSELF OR YOUR FAMILY DOWN: NOT AT ALL
SUM OF ALL RESPONSES TO PHQ QUESTIONS 1-9: 0
SUM OF ALL RESPONSES TO PHQ QUESTIONS 1-9: 0
1. LITTLE INTEREST OR PLEASURE IN DOING THINGS: NOT AT ALL
10. IF YOU CHECKED OFF ANY PROBLEMS, HOW DIFFICULT HAVE THESE PROBLEMS MADE IT FOR YOU TO DO YOUR WORK, TAKE CARE OF THINGS AT HOME, OR GET ALONG WITH OTHER PEOPLE: NOT DIFFICULT AT ALL
2. FEELING DOWN, DEPRESSED OR HOPELESS: NOT AT ALL
8. MOVING OR SPEAKING SO SLOWLY THAT OTHER PEOPLE COULD HAVE NOTICED. OR THE OPPOSITE, BEING SO FIGETY OR RESTLESS THAT YOU HAVE BEEN MOVING AROUND A LOT MORE THAN USUAL: NOT AT ALL

## 2024-06-06 ASSESSMENT — ANXIETY QUESTIONNAIRES
2. NOT BEING ABLE TO STOP OR CONTROL WORRYING: NOT AT ALL
1. FEELING NERVOUS, ANXIOUS, OR ON EDGE: NOT AT ALL
5. BEING SO RESTLESS THAT IT IS HARD TO SIT STILL: NOT AT ALL
4. TROUBLE RELAXING: NOT AT ALL
6. BECOMING EASILY ANNOYED OR IRRITABLE: NOT AT ALL
3. WORRYING TOO MUCH ABOUT DIFFERENT THINGS: NOT AT ALL
7. FEELING AFRAID AS IF SOMETHING AWFUL MIGHT HAPPEN: NOT AT ALL
GAD7 TOTAL SCORE: 0
IF YOU CHECKED OFF ANY PROBLEMS ON THIS QUESTIONNAIRE, HOW DIFFICULT HAVE THESE PROBLEMS MADE IT FOR YOU TO DO YOUR WORK, TAKE CARE OF THINGS AT HOME, OR GET ALONG WITH OTHER PEOPLE: NOT DIFFICULT AT ALL

## 2024-06-06 NOTE — PROGRESS NOTES
Alok Man is a 78 y.o. male who presents with the following:  Chief Complaint   Patient presents with    Hypertension    Diabetes    Cholesterol Problem    Benign Prostatic Hypertrophy       Patient's blood pressures been doing reasonably well on current medication and his current diet without any chest pain shortness of breath nor any edema.  Patient still has his peripheral vascular disease and if he moves too fast he will have some cramps in the legs.  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  Patient's hypercholesterolemia has been treated quite well with atorvastatin however he has been having more cramps lately especially at night so advised him to try stopping the medication to see if the cramps go away and if they do not then we will restart the atorvastatin but if they do we will have to find another medication patient also states that he been having some brain fog which again could be the atorvastatin.  The patient had stopped his metformin because he thought that was one of the contributors to the brain fog which has cleared up since he has been off the atorvastatin and the metformin but I told him I think the atorvastatin was the culprit for both problems.  The patient does have BPH with urinary frequency that seems to be tolerable without any further medication.  His seasonal affective disorder generally affects him more in the winter.  As far as his diabetes his blood sugars have gone up considerably and he was asked to come in to discuss this and I told him he really needs to get back on his metformin as his blood sugars have caused his A1c to go up to 8.7.        No Known Allergies    Current Outpatient Medications   Medication Sig Dispense Refill    amLODIPine (NORVASC) 10 MG

## 2024-06-06 NOTE — PROGRESS NOTES
\"Have you been to the ER, urgent care clinic since your last visit?  Hospitalized since your last visit?\"    NO    “Have you seen or consulted any other health care providers outside of [facility] since your last visit?”    NO            Click Here for Release of Records Request

## 2024-07-09 RX ORDER — BLOOD SUGAR DIAGNOSTIC
STRIP MISCELLANEOUS
Qty: 100 STRIP | Refills: 5 | Status: SHIPPED | OUTPATIENT
Start: 2024-07-09

## 2024-07-22 ENCOUNTER — LAB (OUTPATIENT)
Age: 79
End: 2024-07-22
Payer: OTHER GOVERNMENT

## 2024-07-22 DIAGNOSIS — E78.2 MIXED HYPERLIPIDEMIA: ICD-10-CM

## 2024-07-22 DIAGNOSIS — E11.51 DIABETES MELLITUS WITH PERIPHERAL CIRCULATORY DISORDER (HCC): Primary | ICD-10-CM

## 2024-07-22 DIAGNOSIS — I10 HYPERTENSION, UNSPECIFIED TYPE: ICD-10-CM

## 2024-07-22 PROCEDURE — 36415 COLL VENOUS BLD VENIPUNCTURE: CPT | Performed by: FAMILY MEDICINE

## 2024-07-23 LAB
ALBUMIN SERPL-MCNC: 3.7 G/DL (ref 3.5–5)
ALBUMIN/GLOB SERPL: 1.3 (ref 1.1–2.2)
ALP SERPL-CCNC: 87 U/L (ref 45–117)
ALT SERPL-CCNC: 19 U/L (ref 12–78)
ANION GAP SERPL CALC-SCNC: 5 MMOL/L (ref 5–15)
AST SERPL-CCNC: 13 U/L (ref 15–37)
BASOPHILS # BLD: 0.1 K/UL (ref 0–0.1)
BASOPHILS NFR BLD: 1 % (ref 0–1)
BILIRUB SERPL-MCNC: 0.6 MG/DL (ref 0.2–1)
BUN SERPL-MCNC: 21 MG/DL (ref 6–20)
BUN/CREAT SERPL: 16 (ref 12–20)
CALCIUM SERPL-MCNC: 8.9 MG/DL (ref 8.5–10.1)
CHLORIDE SERPL-SCNC: 106 MMOL/L (ref 97–108)
CHOLEST SERPL-MCNC: 199 MG/DL
CO2 SERPL-SCNC: 29 MMOL/L (ref 21–32)
CREAT SERPL-MCNC: 1.28 MG/DL (ref 0.7–1.3)
DIFFERENTIAL METHOD BLD: NORMAL
EOSINOPHIL # BLD: 0.2 K/UL (ref 0–0.4)
EOSINOPHIL NFR BLD: 5 % (ref 0–7)
ERYTHROCYTE [DISTWIDTH] IN BLOOD BY AUTOMATED COUNT: 12.6 % (ref 11.5–14.5)
EST. AVERAGE GLUCOSE BLD GHB EST-MCNC: 180 MG/DL
GLOBULIN SER CALC-MCNC: 2.9 G/DL (ref 2–4)
GLUCOSE SERPL-MCNC: 181 MG/DL (ref 65–100)
HBA1C MFR BLD: 7.9 % (ref 4–5.6)
HCT VFR BLD AUTO: 41 % (ref 36.6–50.3)
HDLC SERPL-MCNC: 43 MG/DL
HDLC SERPL: 4.6 (ref 0–5)
HGB BLD-MCNC: 14.5 G/DL (ref 12.1–17)
IMM GRANULOCYTES # BLD AUTO: 0 K/UL (ref 0–0.04)
IMM GRANULOCYTES NFR BLD AUTO: 0 % (ref 0–0.5)
LDLC SERPL CALC-MCNC: 134 MG/DL (ref 0–100)
LYMPHOCYTES # BLD: 1 K/UL (ref 0.8–3.5)
LYMPHOCYTES NFR BLD: 19 % (ref 12–49)
MCH RBC QN AUTO: 32.1 PG (ref 26–34)
MCHC RBC AUTO-ENTMCNC: 35.4 G/DL (ref 30–36.5)
MCV RBC AUTO: 90.7 FL (ref 80–99)
MONOCYTES # BLD: 0.5 K/UL (ref 0–1)
MONOCYTES NFR BLD: 9 % (ref 5–13)
NEUTS SEG # BLD: 3.4 K/UL (ref 1.8–8)
NEUTS SEG NFR BLD: 66 % (ref 32–75)
NRBC # BLD: 0 K/UL (ref 0–0.01)
NRBC BLD-RTO: 0 PER 100 WBC
PLATELET # BLD AUTO: 238 K/UL (ref 150–400)
PMV BLD AUTO: 11.7 FL (ref 8.9–12.9)
POTASSIUM SERPL-SCNC: 4.1 MMOL/L (ref 3.5–5.1)
PROT SERPL-MCNC: 6.6 G/DL (ref 6.4–8.2)
RBC # BLD AUTO: 4.52 M/UL (ref 4.1–5.7)
SODIUM SERPL-SCNC: 140 MMOL/L (ref 136–145)
TRIGL SERPL-MCNC: 110 MG/DL
VLDLC SERPL CALC-MCNC: 22 MG/DL
WBC # BLD AUTO: 5.1 K/UL (ref 4.1–11.1)

## 2024-09-19 DIAGNOSIS — I10 ESSENTIAL (PRIMARY) HYPERTENSION: ICD-10-CM

## 2024-09-19 RX ORDER — AMLODIPINE BESYLATE 10 MG/1
10 TABLET ORAL DAILY
Qty: 90 TABLET | Refills: 1 | Status: SHIPPED | OUTPATIENT
Start: 2024-09-19

## 2024-11-19 ENCOUNTER — TELEPHONE (OUTPATIENT)
Age: 79
End: 2024-11-19

## 2024-11-19 DIAGNOSIS — G50.0 TRIGEMINAL NEURALGIA: Primary | ICD-10-CM

## 2024-11-19 NOTE — TELEPHONE ENCOUNTER
Pt is needing a [facility] approval / referral to see Jackie Patrick [facility], neurology Adult outpatient, [phone].

## 2024-12-04 ENCOUNTER — OFFICE VISIT (OUTPATIENT)
Age: 79
End: 2024-12-04
Payer: OTHER GOVERNMENT

## 2024-12-04 VITALS
OXYGEN SATURATION: 97 % | HEIGHT: 68 IN | HEART RATE: 66 BPM | WEIGHT: 183.4 LBS | SYSTOLIC BLOOD PRESSURE: 129 MMHG | DIASTOLIC BLOOD PRESSURE: 82 MMHG | RESPIRATION RATE: 16 BRPM | TEMPERATURE: 97.7 F | BODY MASS INDEX: 27.8 KG/M2

## 2024-12-04 DIAGNOSIS — I65.23 BILATERAL CAROTID ARTERY STENOSIS: ICD-10-CM

## 2024-12-04 DIAGNOSIS — L21.9 SEBORRHEIC DERMATITIS: ICD-10-CM

## 2024-12-04 DIAGNOSIS — F32.A DEPRESSION, UNSPECIFIED DEPRESSION TYPE: ICD-10-CM

## 2024-12-04 DIAGNOSIS — E78.00 PURE HYPERCHOLESTEROLEMIA, UNSPECIFIED: ICD-10-CM

## 2024-12-04 DIAGNOSIS — R35.0 URINARY FREQUENCY: ICD-10-CM

## 2024-12-04 DIAGNOSIS — E11.59 TYPE 2 DIABETES MELLITUS WITH OTHER CIRCULATORY COMPLICATION, WITHOUT LONG-TERM CURRENT USE OF INSULIN (HCC): Primary | ICD-10-CM

## 2024-12-04 DIAGNOSIS — Z86.73 HISTORY OF CVA (CEREBROVASCULAR ACCIDENT): ICD-10-CM

## 2024-12-04 PROBLEM — I65.21 STENOSIS OF RIGHT CAROTID ARTERY: Status: ACTIVE | Noted: 2024-12-04

## 2024-12-04 PROBLEM — I65.22 LEFT CAROTID ARTERY STENOSIS: Status: ACTIVE | Noted: 2024-12-04

## 2024-12-04 PROBLEM — I63.9 ISCHEMIC STROKE (HCC): Status: ACTIVE | Noted: 2022-04-19

## 2024-12-04 LAB
BILIRUBIN, URINE, POC: NEGATIVE
BLOOD URINE, POC: NEGATIVE
GLUCOSE URINE, POC: 500
HBA1C MFR BLD: 8.7 %
KETONES, URINE, POC: NEGATIVE
LEUKOCYTE ESTERASE, URINE, POC: NEGATIVE
NITRITE, URINE, POC: NEGATIVE
PH, URINE, POC: 5.5 (ref 4.6–8)
PROTEIN,URINE, POC: 30
SPECIFIC GRAVITY, URINE, POC: 1.02 (ref 1–1.03)
URINALYSIS CLARITY, POC: CLEAR
URINALYSIS COLOR, POC: YELLOW
UROBILINOGEN, POC: NORMAL MG/DL

## 2024-12-04 PROCEDURE — 3074F SYST BP LT 130 MM HG: CPT | Performed by: STUDENT IN AN ORGANIZED HEALTH CARE EDUCATION/TRAINING PROGRAM

## 2024-12-04 PROCEDURE — 1123F ACP DISCUSS/DSCN MKR DOCD: CPT | Performed by: STUDENT IN AN ORGANIZED HEALTH CARE EDUCATION/TRAINING PROGRAM

## 2024-12-04 PROCEDURE — 3079F DIAST BP 80-89 MM HG: CPT | Performed by: STUDENT IN AN ORGANIZED HEALTH CARE EDUCATION/TRAINING PROGRAM

## 2024-12-04 PROCEDURE — 99214 OFFICE O/P EST MOD 30 MIN: CPT | Performed by: STUDENT IN AN ORGANIZED HEALTH CARE EDUCATION/TRAINING PROGRAM

## 2024-12-04 PROCEDURE — 81001 URINALYSIS AUTO W/SCOPE: CPT | Performed by: STUDENT IN AN ORGANIZED HEALTH CARE EDUCATION/TRAINING PROGRAM

## 2024-12-04 PROCEDURE — 3051F HG A1C>EQUAL 7.0%<8.0%: CPT | Performed by: STUDENT IN AN ORGANIZED HEALTH CARE EDUCATION/TRAINING PROGRAM

## 2024-12-04 PROCEDURE — 83036 HEMOGLOBIN GLYCOSYLATED A1C: CPT | Performed by: STUDENT IN AN ORGANIZED HEALTH CARE EDUCATION/TRAINING PROGRAM

## 2024-12-04 RX ORDER — TAMSULOSIN HYDROCHLORIDE 0.4 MG/1
0.4 CAPSULE ORAL DAILY
COMMUNITY

## 2024-12-04 RX ORDER — METFORMIN HYDROCHLORIDE 500 MG/1
1000 TABLET, EXTENDED RELEASE ORAL 2 TIMES DAILY WITH MEALS
Qty: 180 TABLET | Refills: 1 | Status: SHIPPED | OUTPATIENT
Start: 2024-12-04

## 2024-12-04 ASSESSMENT — PATIENT HEALTH QUESTIONNAIRE - PHQ9
SUM OF ALL RESPONSES TO PHQ QUESTIONS 1-9: 13
4. FEELING TIRED OR HAVING LITTLE ENERGY: SEVERAL DAYS
2. FEELING DOWN, DEPRESSED OR HOPELESS: NEARLY EVERY DAY
SUM OF ALL RESPONSES TO PHQ QUESTIONS 1-9: 14
3. TROUBLE FALLING OR STAYING ASLEEP: NEARLY EVERY DAY
10. IF YOU CHECKED OFF ANY PROBLEMS, HOW DIFFICULT HAVE THESE PROBLEMS MADE IT FOR YOU TO DO YOUR WORK, TAKE CARE OF THINGS AT HOME, OR GET ALONG WITH OTHER PEOPLE: SOMEWHAT DIFFICULT
1. LITTLE INTEREST OR PLEASURE IN DOING THINGS: NEARLY EVERY DAY
SUM OF ALL RESPONSES TO PHQ QUESTIONS 1-9: 14
SUM OF ALL RESPONSES TO PHQ QUESTIONS 1-9: 14
6. FEELING BAD ABOUT YOURSELF - OR THAT YOU ARE A FAILURE OR HAVE LET YOURSELF OR YOUR FAMILY DOWN: NEARLY EVERY DAY
8. MOVING OR SPEAKING SO SLOWLY THAT OTHER PEOPLE COULD HAVE NOTICED. OR THE OPPOSITE, BEING SO FIGETY OR RESTLESS THAT YOU HAVE BEEN MOVING AROUND A LOT MORE THAN USUAL: NOT AT ALL
SUM OF ALL RESPONSES TO PHQ9 QUESTIONS 1 & 2: 6
9. THOUGHTS THAT YOU WOULD BE BETTER OFF DEAD, OR OF HURTING YOURSELF: SEVERAL DAYS
7. TROUBLE CONCENTRATING ON THINGS, SUCH AS READING THE NEWSPAPER OR WATCHING TELEVISION: NOT AT ALL
5. POOR APPETITE OR OVEREATING: NOT AT ALL

## 2024-12-04 ASSESSMENT — COLUMBIA-SUICIDE SEVERITY RATING SCALE - C-SSRS
5. HAVE YOU STARTED TO WORK OUT OR WORKED OUT THE DETAILS OF HOW TO KILL YOURSELF? DO YOU INTEND TO CARRY OUT THIS PLAN?: NO
3. HAVE YOU BEEN THINKING ABOUT HOW YOU MIGHT KILL YOURSELF?: NO
6. HAVE YOU EVER DONE ANYTHING, STARTED TO DO ANYTHING, OR PREPARED TO DO ANYTHING TO END YOUR LIFE?: NO
4. HAVE YOU HAD THESE THOUGHTS AND HAD SOME INTENTION OF ACTING ON THEM?: NO
1. WITHIN THE PAST MONTH, HAVE YOU WISHED YOU WERE DEAD OR WISHED YOU COULD GO TO SLEEP AND NOT WAKE UP?: NO
2. HAVE YOU ACTUALLY HAD ANY THOUGHTS OF KILLING YOURSELF?: YES

## 2024-12-04 NOTE — PROGRESS NOTES
Chief Complaint   Patient presents with    Diabetes       Vitals:    12/04/24 0933   BP: 129/82   Pulse: 66   Resp: 16   Temp: 97.7 °F (36.5 °C)   SpO2: 97%   \"Have you been to the ER, urgent care clinic since your last visit?  Hospitalized since your last visit?\"    NO    “Have you seen or consulted any other health care providers outside our system since your last visit?”    NO           
Apply topically 2 times daily, Disp: , Rfl:     senna (SENOKOT) 8.6 MG tablet, TAKE 1 TABLET TWICE A DAY AS NEEDED FOR CONSTIPATION, Disp: , Rfl:     Social History     Tobacco Use   Smoking Status Former   Smokeless Tobacco Never       Review of Systems  ROS:  Gen: denies fever, chills, or fatigue  Resp: denies dyspnea, cough, or wheezing  CV: denies chest pain, pressure, or palpitations  GI:: denies abdominal pain, nausea, vomiting, diarrhea, or constipation  Neuro: denies numbness/tingling or dizziness          Objective:     /82 (Site: Left Upper Arm)   Pulse 66   Temp 97.7 °F (36.5 °C) (Oral)   Resp 16   Ht 1.727 m (5' 8\")   Wt 83.2 kg (183 lb 6.4 oz)   SpO2 97%   BMI 27.89 kg/m²   Body mass index is 27.89 kg/m².    Physical Exam   General: Alert and oriented. No acute distress.  Well nourished.  HEENT :  Eyes: Sclera white, conjunctiva clear.   Mouth: Pharynx non-erythematous, without exudate   Neck: Supple with FROM. No thyroid-megaly  Lungs: no increased wob, All lobes clear to auscultation bilaterally   Heart :RRR, no murmur   Extremities: Non-edematous  Abdomen: Soft and non-distended. Bowel sounds active. No tenderness to palpation, no masses.  Neuro: Cranial nerves grossly normal.  Mental status: Cognition, concentration, memory, and speech intact.   Psych: Mood and thought content appropriate for situation. Dressed appropriately and with good hygiene.  Skin: Warm, dry, and intact. No lesions or discoloration visible.    PHQ-9 Total Score: 14 (12/4/2024  9:38 AM)  Thoughts that you would be better off dead, or of hurting yourself in some way: 1 (states he would not hurt hisself) (12/4/2024  9:38 AM)          6/6/2024     9:42 AM 10/13/2023    10:10 AM   JOHNATHON 7 SCORE   JOHNATHON-7 Total Score 0 0     Interpretation of JOHNATHON-7 score: 5-9 = mild anxiety, 10-14 = moderate anxiety, 15+ = severe anxiety. Recommend referral to behavioral health for scores 10 or greater.        Assessment/ Plan:     Alok greenwood

## 2024-12-04 NOTE — PATIENT INSTRUCTIONS
Increase metformin 500mg 2x daily     If you are doing well with this gradually increase metformin to 2 pills twice daily (1000mg twice daily)    Start atorvastatin back for cholesterol

## 2024-12-05 LAB
CREAT UR-MCNC: 196 MG/DL
MICROALBUMIN UR-MCNC: 9.98 MG/DL
MICROALBUMIN/CREAT UR-RTO: 51 MG/G (ref 0–30)

## 2024-12-27 NOTE — TELEPHONE ENCOUNTER
Pharmacy refill request for:    metFORMIN (GLUCOPHAGE-XR) 500 MG extended release tablet [[id number]]     LOV 12/4/24    [id number] [place] [facility] [address] [facility] P [phone] - F [phone] [[id number]]

## 2024-12-28 RX ORDER — METFORMIN HYDROCHLORIDE 500 MG/1
1000 TABLET, EXTENDED RELEASE ORAL 2 TIMES DAILY WITH MEALS
Qty: 180 TABLET | Refills: 1 | Status: SHIPPED | OUTPATIENT
Start: 2024-12-28

## 2025-03-04 ENCOUNTER — OFFICE VISIT (OUTPATIENT)
Age: 80
End: 2025-03-04
Payer: OTHER GOVERNMENT

## 2025-03-04 VITALS
TEMPERATURE: 97.3 F | RESPIRATION RATE: 18 BRPM | BODY MASS INDEX: 27.55 KG/M2 | HEIGHT: 68 IN | SYSTOLIC BLOOD PRESSURE: 118 MMHG | HEART RATE: 67 BPM | WEIGHT: 181.8 LBS | OXYGEN SATURATION: 96 % | DIASTOLIC BLOOD PRESSURE: 60 MMHG

## 2025-03-04 DIAGNOSIS — E11.59 TYPE 2 DIABETES MELLITUS WITH OTHER CIRCULATORY COMPLICATION, WITHOUT LONG-TERM CURRENT USE OF INSULIN (HCC): Primary | ICD-10-CM

## 2025-03-04 DIAGNOSIS — I10 ESSENTIAL HYPERTENSION: ICD-10-CM

## 2025-03-04 DIAGNOSIS — Z86.73 HISTORY OF STROKE: ICD-10-CM

## 2025-03-04 DIAGNOSIS — E78.00 HYPERCHOLESTEROLEMIA: ICD-10-CM

## 2025-03-04 DIAGNOSIS — N40.1 BENIGN PROSTATIC HYPERPLASIA WITH URINARY FREQUENCY: ICD-10-CM

## 2025-03-04 DIAGNOSIS — F32.A DEPRESSION, UNSPECIFIED DEPRESSION TYPE: ICD-10-CM

## 2025-03-04 DIAGNOSIS — R35.0 BENIGN PROSTATIC HYPERPLASIA WITH URINARY FREQUENCY: ICD-10-CM

## 2025-03-04 PROCEDURE — 3074F SYST BP LT 130 MM HG: CPT | Performed by: STUDENT IN AN ORGANIZED HEALTH CARE EDUCATION/TRAINING PROGRAM

## 2025-03-04 PROCEDURE — 36415 COLL VENOUS BLD VENIPUNCTURE: CPT | Performed by: STUDENT IN AN ORGANIZED HEALTH CARE EDUCATION/TRAINING PROGRAM

## 2025-03-04 PROCEDURE — 1123F ACP DISCUSS/DSCN MKR DOCD: CPT | Performed by: STUDENT IN AN ORGANIZED HEALTH CARE EDUCATION/TRAINING PROGRAM

## 2025-03-04 PROCEDURE — 99214 OFFICE O/P EST MOD 30 MIN: CPT | Performed by: STUDENT IN AN ORGANIZED HEALTH CARE EDUCATION/TRAINING PROGRAM

## 2025-03-04 PROCEDURE — 3078F DIAST BP <80 MM HG: CPT | Performed by: STUDENT IN AN ORGANIZED HEALTH CARE EDUCATION/TRAINING PROGRAM

## 2025-03-04 SDOH — ECONOMIC STABILITY: FOOD INSECURITY: WITHIN THE PAST 12 MONTHS, YOU WORRIED THAT YOUR FOOD WOULD RUN OUT BEFORE YOU GOT MONEY TO BUY MORE.: NEVER TRUE

## 2025-03-04 SDOH — ECONOMIC STABILITY: FOOD INSECURITY: WITHIN THE PAST 12 MONTHS, THE FOOD YOU BOUGHT JUST DIDN'T LAST AND YOU DIDN'T HAVE MONEY TO GET MORE.: NEVER TRUE

## 2025-03-04 ASSESSMENT — PATIENT HEALTH QUESTIONNAIRE - PHQ9
SUM OF ALL RESPONSES TO PHQ QUESTIONS 1-9: 0
SUM OF ALL RESPONSES TO PHQ QUESTIONS 1-9: 0
1. LITTLE INTEREST OR PLEASURE IN DOING THINGS: NOT AT ALL
2. FEELING DOWN, DEPRESSED OR HOPELESS: NOT AT ALL
SUM OF ALL RESPONSES TO PHQ QUESTIONS 1-9: 0
SUM OF ALL RESPONSES TO PHQ QUESTIONS 1-9: 0

## 2025-03-04 NOTE — PROGRESS NOTES
Chief Complaint   Patient presents with    Hypertension       Vitals:    03/04/25 0917   BP: 118/60   Pulse: 67   Resp: 18   Temp: 97.3 °F (36.3 °C)   SpO2: 96%   \"Have you been to the ER, urgent care clinic since your last visit?  Hospitalized since your last visit?\"    NO    “Have you seen or consulted any other health care providers outside our system since your last visit?”    NO           
Labs drawn in left arm per Dr Garcia's orders.  Patient tolerated well.  
likely need to have this further.   Pending A1c consider adding Januvia first Jardiance.  She is not interested in injectable medications.   He will continue his aspirin and statin.    -     AMB POC HEMOGLOBIN A1C    History of CVA (cerebrovascular accident)  Follows with neurology, chronic right-sided deficits which have overall been stable.  Warning signs ER precautions given.  Completed course of PT following last visit.  -     External Referral To Physical Therapy    Pure hypercholesterolemia, unspecified  Repeat lipid panel today, back on statin    BPH  Urinary frequency has improved with restarting Flomax and improved glycemic control.  Not interested in repeat PSA monitoring after discussion which is I think is reasonable given his age.    Bilateral carotid artery stenosis  Monitoring through [facility] mild disease on last carotid ultrasound a year ago.  Continue aspirin and statin he will [facility].  He will let us know if he is not able to follow-up through their office..    Depression, unspecified depression type  Overall depression is improved from last visit.  Not interested in medication or therapy.  He will let us know if he has any worsening symptoms.           Verbal and written instructions (see AVS) provided.  Patient expresses understanding of diagnosis and treatment plan.        No follow-up provider specified.    This note was created with the assistance of voice to text technology. Please excuse any potential spelling or grammatical errors.    Shelby Garcia MD

## 2025-03-05 LAB
ALBUMIN SERPL-MCNC: 3.8 G/DL (ref 3.5–5)
ALBUMIN/GLOB SERPL: 1.2 (ref 1.1–2.2)
ALP SERPL-CCNC: 106 U/L (ref 45–117)
ALT SERPL-CCNC: 27 U/L (ref 12–78)
ANION GAP SERPL CALC-SCNC: 7 MMOL/L (ref 2–12)
AST SERPL-CCNC: 22 U/L (ref 15–37)
BILIRUB SERPL-MCNC: 0.6 MG/DL (ref 0.2–1)
BUN SERPL-MCNC: 21 MG/DL (ref 6–20)
BUN/CREAT SERPL: 16 (ref 12–20)
CALCIUM SERPL-MCNC: 10 MG/DL (ref 8.5–10.1)
CHLORIDE SERPL-SCNC: 102 MMOL/L (ref 97–108)
CHOLEST SERPL-MCNC: 137 MG/DL
CO2 SERPL-SCNC: 29 MMOL/L (ref 21–32)
CREAT SERPL-MCNC: 1.3 MG/DL (ref 0.7–1.3)
EST. AVERAGE GLUCOSE BLD GHB EST-MCNC: 209 MG/DL
GLOBULIN SER CALC-MCNC: 3.1 G/DL (ref 2–4)
GLUCOSE SERPL-MCNC: 190 MG/DL (ref 65–100)
HBA1C MFR BLD: 8.9 % (ref 4–5.6)
HDLC SERPL-MCNC: 50 MG/DL
HDLC SERPL: 2.7 (ref 0–5)
LDLC SERPL CALC-MCNC: 70.2 MG/DL (ref 0–100)
POTASSIUM SERPL-SCNC: 4.2 MMOL/L (ref 3.5–5.1)
PROT SERPL-MCNC: 6.9 G/DL (ref 6.4–8.2)
SODIUM SERPL-SCNC: 138 MMOL/L (ref 136–145)
TRIGL SERPL-MCNC: 84 MG/DL
VLDLC SERPL CALC-MCNC: 16.8 MG/DL

## 2025-03-17 DIAGNOSIS — E78.00 PURE HYPERCHOLESTEROLEMIA, UNSPECIFIED: ICD-10-CM

## 2025-03-17 NOTE — TELEPHONE ENCOUNTER
Pharmacy refill request for:      atorvastatin (LIPITOR) 40 MG tablet [[id number]]     LOV 3/4/25    [facility]/PHARMACY #[id number] - [place], [place] - [facility] [address] [facility] [facility] P [phone] - F [phone] [[id number]]

## 2025-03-17 NOTE — TELEPHONE ENCOUNTER
Patient requesting refill on     Requested Prescriptions      No prescriptions requested or ordered in this encounter        Last OV 3/4/2025

## 2025-03-18 ENCOUNTER — CLINICAL DOCUMENTATION (OUTPATIENT)
Age: 80
End: 2025-03-18

## 2025-03-18 RX ORDER — ATORVASTATIN CALCIUM 40 MG/1
40 TABLET, FILM COATED ORAL DAILY
Qty: 90 TABLET | Refills: 1 | Status: SHIPPED | OUTPATIENT
Start: 2025-03-18

## 2025-05-01 DIAGNOSIS — I10 ESSENTIAL (PRIMARY) HYPERTENSION: ICD-10-CM

## 2025-05-01 RX ORDER — AMLODIPINE BESYLATE 10 MG/1
10 TABLET ORAL DAILY
Qty: 90 TABLET | Refills: 1 | Status: SHIPPED | OUTPATIENT
Start: 2025-05-01

## 2025-05-14 NOTE — TELEPHONE ENCOUNTER
Medication Refill Request    Alok Man is requesting a refill of the following medication(s):     Metformin  MG Tab    (Needs a 90 day supply per insurance)    Please send refill to:     [facility] [address] [place] [address] - [address] [facility] -  [phone] - F [phone]  [address] [facility] [address]  Phone: [phone] Fax: [phone]

## 2025-05-15 RX ORDER — METFORMIN HYDROCHLORIDE 500 MG/1
1000 TABLET, EXTENDED RELEASE ORAL 2 TIMES DAILY WITH MEALS
Qty: 360 TABLET | Refills: 1 | Status: SHIPPED | OUTPATIENT
Start: 2025-05-15

## 2025-07-08 ENCOUNTER — OFFICE VISIT (OUTPATIENT)
Age: 80
End: 2025-07-08
Payer: OTHER GOVERNMENT

## 2025-07-08 VITALS
TEMPERATURE: 97 F | RESPIRATION RATE: 18 BRPM | SYSTOLIC BLOOD PRESSURE: 120 MMHG | DIASTOLIC BLOOD PRESSURE: 67 MMHG | HEART RATE: 71 BPM | OXYGEN SATURATION: 97 % | BODY MASS INDEX: 26.52 KG/M2 | WEIGHT: 175 LBS | HEIGHT: 68 IN

## 2025-07-08 DIAGNOSIS — R79.89 LOW VITAMIN D LEVEL: ICD-10-CM

## 2025-07-08 DIAGNOSIS — I65.21 STENOSIS OF RIGHT CAROTID ARTERY: ICD-10-CM

## 2025-07-08 DIAGNOSIS — G62.9 PERIPHERAL POLYNEUROPATHY: ICD-10-CM

## 2025-07-08 DIAGNOSIS — G62.9 NEUROPATHY: ICD-10-CM

## 2025-07-08 DIAGNOSIS — E11.59 TYPE 2 DIABETES MELLITUS WITH OTHER CIRCULATORY COMPLICATION, WITHOUT LONG-TERM CURRENT USE OF INSULIN (HCC): ICD-10-CM

## 2025-07-08 DIAGNOSIS — E08.22 DIABETES MELLITUS DUE TO UNDERLYING CONDITION WITH CHRONIC KIDNEY DISEASE, WITHOUT LONG-TERM CURRENT USE OF INSULIN, UNSPECIFIED CKD STAGE (HCC): Primary | ICD-10-CM

## 2025-07-08 LAB — HBA1C MFR BLD: 7 %

## 2025-07-08 PROCEDURE — 3051F HG A1C>EQUAL 7.0%<8.0%: CPT | Performed by: STUDENT IN AN ORGANIZED HEALTH CARE EDUCATION/TRAINING PROGRAM

## 2025-07-08 PROCEDURE — 1123F ACP DISCUSS/DSCN MKR DOCD: CPT | Performed by: STUDENT IN AN ORGANIZED HEALTH CARE EDUCATION/TRAINING PROGRAM

## 2025-07-08 PROCEDURE — 3074F SYST BP LT 130 MM HG: CPT | Performed by: STUDENT IN AN ORGANIZED HEALTH CARE EDUCATION/TRAINING PROGRAM

## 2025-07-08 PROCEDURE — 99214 OFFICE O/P EST MOD 30 MIN: CPT | Performed by: STUDENT IN AN ORGANIZED HEALTH CARE EDUCATION/TRAINING PROGRAM

## 2025-07-08 PROCEDURE — 83036 HEMOGLOBIN GLYCOSYLATED A1C: CPT | Performed by: STUDENT IN AN ORGANIZED HEALTH CARE EDUCATION/TRAINING PROGRAM

## 2025-07-08 PROCEDURE — 3078F DIAST BP <80 MM HG: CPT | Performed by: STUDENT IN AN ORGANIZED HEALTH CARE EDUCATION/TRAINING PROGRAM

## 2025-07-08 PROCEDURE — 36415 COLL VENOUS BLD VENIPUNCTURE: CPT | Performed by: STUDENT IN AN ORGANIZED HEALTH CARE EDUCATION/TRAINING PROGRAM

## 2025-07-08 RX ORDER — TAMSULOSIN HYDROCHLORIDE 0.4 MG/1
0.4 CAPSULE ORAL DAILY
Qty: 30 CAPSULE | Refills: 2 | Status: SHIPPED | OUTPATIENT
Start: 2025-07-08

## 2025-07-08 ASSESSMENT — PATIENT HEALTH QUESTIONNAIRE - PHQ9
8. MOVING OR SPEAKING SO SLOWLY THAT OTHER PEOPLE COULD HAVE NOTICED. OR THE OPPOSITE, BEING SO FIGETY OR RESTLESS THAT YOU HAVE BEEN MOVING AROUND A LOT MORE THAN USUAL: NOT AT ALL
2. FEELING DOWN, DEPRESSED OR HOPELESS: NOT AT ALL
SUM OF ALL RESPONSES TO PHQ QUESTIONS 1-9: 0
3. TROUBLE FALLING OR STAYING ASLEEP: NOT AT ALL
SUM OF ALL RESPONSES TO PHQ QUESTIONS 1-9: 0
4. FEELING TIRED OR HAVING LITTLE ENERGY: NOT AT ALL
9. THOUGHTS THAT YOU WOULD BE BETTER OFF DEAD, OR OF HURTING YOURSELF: NOT AT ALL
6. FEELING BAD ABOUT YOURSELF - OR THAT YOU ARE A FAILURE OR HAVE LET YOURSELF OR YOUR FAMILY DOWN: NOT AT ALL
1. LITTLE INTEREST OR PLEASURE IN DOING THINGS: NOT AT ALL
7. TROUBLE CONCENTRATING ON THINGS, SUCH AS READING THE NEWSPAPER OR WATCHING TELEVISION: NOT AT ALL
5. POOR APPETITE OR OVEREATING: NOT AT ALL
10. IF YOU CHECKED OFF ANY PROBLEMS, HOW DIFFICULT HAVE THESE PROBLEMS MADE IT FOR YOU TO DO YOUR WORK, TAKE CARE OF THINGS AT HOME, OR GET ALONG WITH OTHER PEOPLE: NOT DIFFICULT AT ALL
SUM OF ALL RESPONSES TO PHQ QUESTIONS 1-9: 0
SUM OF ALL RESPONSES TO PHQ QUESTIONS 1-9: 0

## 2025-07-08 NOTE — PROGRESS NOTES
Have you been to the ER, urgent care clinic since your last visit?  Hospitalized since your last visit?   NO    Have you seen or consulted any other health care providers outside our system since your last visit?   NO           
Labs drawn in left arm per Dr Garcia's orders.  Patient tolerated well.  
Labs or external results:  Lab Results   Component Value Date    LABA1C 8.9 (H) 2025    LABA1C 7.9 (H) 2024    LABA1C 8.7 (H) 2024         Lipid:   Lab Results   Component Value Date    CHOL 137 2025    CHOL 199 2024    CHOL 123 2024     Lab Results   Component Value Date    HDL 50 2025     Lab Results   Component Value Date    LDL 70.2 2025     Lab Results   Component Value Date    TRIG 84 2025       Last GFR:  Lab Results   Component Value Date    LABGLOM 56 (L) 2025    LABGLOM 58 (L) 2024    LABGLOM 64 2024       Urine microalbumin or microalbumin/creatinine ratio:   No results found for: \"MALBCR\"        The ASCVD Risk score (Maria C CARROLL, et al., 2019) failed to calculate for the following reasons:    Risk score cannot be calculated because patient has a medical history suggesting prior/existing ASCVD                       Social History     Tobacco Use   Smoking Status Former    Current packs/day: 0.00    Average packs/day: 0.1 packs/day for 53.0 years (5.3 ttl pk-yrs)    Types: Cigarettes    Start date:     Quit date:     Years since quittin.5   Smokeless Tobacco Never      BP Readings from Last 3 Encounters:   25 120/67   25 118/60   24 129/82     Lab Results   Component Value Date    LABGLOM 56 (L) 2025    LABGLOM 58 (L) 2024    LABGLOM 64 2024      Lab Results   Component Value Date    CREATININE 1.30 2025    CREATININE 1.28 2024    CREATININE 1.18 2024     No results found for: \"MALBCR\"     Wt Readings from Last 5 Encounters:   25 79.4 kg (175 lb)   25 82.5 kg (181 lb 12.8 oz)   24 83.2 kg (183 lb 6.4 oz)   24 79.4 kg (175 lb)   24 80.7 kg (178 lb)          Patient Active Problem List   Diagnosis    Hypercholesterolemia    Tinea unguium    Former smoker    Peripheral vascular disease    Benign prostatic hyperplasia with urinary frequency

## 2025-07-09 LAB
25(OH)D3 SERPL-MCNC: 49.4 NG/ML (ref 30–100)
ANION GAP SERPL CALC-SCNC: 3 MMOL/L (ref 2–12)
BASOPHILS # BLD: 0.04 K/UL (ref 0–0.1)
BASOPHILS NFR BLD: 0.7 % (ref 0–1)
BUN SERPL-MCNC: 18 MG/DL (ref 6–20)
BUN/CREAT SERPL: 15 (ref 12–20)
CALCIUM SERPL-MCNC: 9.1 MG/DL (ref 8.5–10.1)
CHLORIDE SERPL-SCNC: 106 MMOL/L (ref 97–108)
CO2 SERPL-SCNC: 29 MMOL/L (ref 21–32)
CREAT SERPL-MCNC: 1.17 MG/DL (ref 0.7–1.3)
DIFFERENTIAL METHOD BLD: NORMAL
EOSINOPHIL # BLD: 0.34 K/UL (ref 0–0.4)
EOSINOPHIL NFR BLD: 6 % (ref 0–7)
ERYTHROCYTE [DISTWIDTH] IN BLOOD BY AUTOMATED COUNT: 12.5 % (ref 11.5–14.5)
GLUCOSE SERPL-MCNC: 161 MG/DL (ref 65–100)
HCT VFR BLD AUTO: 40.9 % (ref 36.6–50.3)
HGB BLD-MCNC: 13.6 G/DL (ref 12.1–17)
IMM GRANULOCYTES # BLD AUTO: 0.01 K/UL (ref 0–0.04)
IMM GRANULOCYTES NFR BLD AUTO: 0.2 % (ref 0–0.5)
LYMPHOCYTES # BLD: 1.07 K/UL (ref 0.8–3.5)
LYMPHOCYTES NFR BLD: 18.9 % (ref 12–49)
MAGNESIUM SERPL-MCNC: 1.8 MG/DL (ref 1.6–2.4)
MCH RBC QN AUTO: 32.3 PG (ref 26–34)
MCHC RBC AUTO-ENTMCNC: 33.3 G/DL (ref 30–36.5)
MCV RBC AUTO: 97.1 FL (ref 80–99)
MONOCYTES # BLD: 0.43 K/UL (ref 0–1)
MONOCYTES NFR BLD: 7.6 % (ref 5–13)
NEUTS SEG # BLD: 3.77 K/UL (ref 1.8–8)
NEUTS SEG NFR BLD: 66.6 % (ref 32–75)
NRBC # BLD: 0 K/UL (ref 0–0.01)
NRBC BLD-RTO: 0 PER 100 WBC
PLATELET # BLD AUTO: 237 K/UL (ref 150–400)
PMV BLD AUTO: 11.5 FL (ref 8.9–12.9)
POTASSIUM SERPL-SCNC: 4 MMOL/L (ref 3.5–5.1)
RBC # BLD AUTO: 4.21 M/UL (ref 4.1–5.7)
SODIUM SERPL-SCNC: 138 MMOL/L (ref 136–145)
WBC # BLD AUTO: 5.7 K/UL (ref 4.1–11.1)

## 2025-07-17 RX ORDER — BLOOD SUGAR DIAGNOSTIC
50 STRIP MISCELLANEOUS 2 TIMES DAILY
Qty: 100 STRIP | Refills: 3 | Status: SHIPPED | OUTPATIENT
Start: 2025-07-17

## 2025-07-17 NOTE — TELEPHONE ENCOUNTER
Patient requesting refill on     Requested Prescriptions     Pending Prescriptions Disp Refills    blood glucose test strips (ONETOUCH ULTRA) strip 100 strip 3     Si each by Other route 2 times daily As needed.        Last OV 2025

## 2025-07-17 NOTE — TELEPHONE ENCOUNTER
Medication Refill Request    Alok Man is requesting a refill of the following medication(s):     One Touch Ultra Blue Test Strips    Please send refill to:       [facility]/pharmacy #[id number] - [place], [place] - [address] [facility] [phone] - F [phone]  [address] [facility] [address]  Phone: [phone] Fax: [phone]

## 2025-07-21 RX ORDER — BLOOD SUGAR DIAGNOSTIC
50 STRIP MISCELLANEOUS 2 TIMES DAILY
Qty: 100 STRIP | Refills: 3 | Status: SHIPPED | OUTPATIENT
Start: 2025-07-21

## 2025-08-08 PROBLEM — I63.9 ISCHEMIC STROKE (HCC): Status: RESOLVED | Noted: 2022-04-19 | Resolved: 2025-08-08
